# Patient Record
Sex: FEMALE | Race: WHITE | NOT HISPANIC OR LATINO | Employment: FULL TIME | URBAN - NONMETROPOLITAN AREA
[De-identification: names, ages, dates, MRNs, and addresses within clinical notes are randomized per-mention and may not be internally consistent; named-entity substitution may affect disease eponyms.]

---

## 2017-06-29 ENCOUNTER — OUTSIDE FACILITY SERVICE (OUTPATIENT)
Dept: CARDIOLOGY | Facility: CLINIC | Age: 27
End: 2017-06-29

## 2017-06-29 PROCEDURE — 93306 TTE W/DOPPLER COMPLETE: CPT | Performed by: INTERNAL MEDICINE

## 2017-07-13 ENCOUNTER — OFFICE VISIT (OUTPATIENT)
Dept: CARDIOLOGY | Facility: CLINIC | Age: 27
End: 2017-07-13

## 2017-07-13 VITALS
HEART RATE: 76 BPM | SYSTOLIC BLOOD PRESSURE: 130 MMHG | DIASTOLIC BLOOD PRESSURE: 60 MMHG | BODY MASS INDEX: 34.93 KG/M2 | WEIGHT: 244 LBS | HEIGHT: 70 IN | OXYGEN SATURATION: 99 %

## 2017-07-13 DIAGNOSIS — Z3A.15 15 WEEKS GESTATION OF PREGNANCY: ICD-10-CM

## 2017-07-13 DIAGNOSIS — I35.0 NONRHEUMATIC AORTIC VALVE STENOSIS: ICD-10-CM

## 2017-07-13 DIAGNOSIS — Z95.2 H/O PULMONIC VALVE REPLACEMENT: Primary | ICD-10-CM

## 2017-07-13 PROBLEM — K27.9 PEPTIC ULCER DISEASE: Status: ACTIVE | Noted: 2017-07-13

## 2017-07-13 PROBLEM — F41.9 ANXIETY: Status: ACTIVE | Noted: 2017-07-13

## 2017-07-13 PROCEDURE — 99213 OFFICE O/P EST LOW 20 MIN: CPT | Performed by: INTERNAL MEDICINE

## 2017-07-13 RX ORDER — HEPARIN SODIUM 5000 [USP'U]/.5ML
5000 INJECTION, SOLUTION INTRAVENOUS; SUBCUTANEOUS EVERY 8 HOURS SCHEDULED
COMMUNITY
End: 2017-12-20

## 2017-07-13 RX ORDER — PANTOPRAZOLE SODIUM 40 MG/1
40 TABLET, DELAYED RELEASE ORAL DAILY
COMMUNITY
End: 2021-03-16 | Stop reason: ALTCHOICE

## 2017-07-13 RX ORDER — LORATADINE ORAL 5 MG/5ML
SOLUTION ORAL AS NEEDED
COMMUNITY
End: 2018-02-23

## 2017-07-13 RX ORDER — LABETALOL 100 MG/1
300 TABLET, FILM COATED ORAL 3 TIMES DAILY
COMMUNITY
End: 2018-02-14 | Stop reason: ALTCHOICE

## 2017-07-13 RX ORDER — PRENATAL VIT NO.126/IRON/FOLIC 28MG-0.8MG
1 TABLET ORAL DAILY
COMMUNITY
End: 2017-12-20

## 2017-07-13 NOTE — PROGRESS NOTES
Reason for Visit: cardiovascular follow up.    HPI:  Nevaeh Atkinson is a 27 y.o. female is here today for follow-up.  She is former patient of Dr Toro and is here for 1 year follow up.  She just had her echo repeated which demonstrated stable gradients and function compared to previous echo from 1 year ago.  She is 15 weeks pregnant.  Does not have any current complaints or problems.  She follows with a high risk MFM specialist who plans to repeat echo at 32 weeks.       Previous Cardiac Testing and Procedures:  - Echo (05/25/2016) EF 60%, grade 2 diastolic filling pattern, mild aortic stenosis with mean gradient 14 mmHg, EB 1.76 cm², mild AI, mild MR, bioprosthetic pulmonary valve with peak gradient 15 mmHg, RVSP 35 mmHg  - Echo (06/29/2017) EF 65%, normal diastolic function, mild aortic stenosis with mean gradient 6 mmHg, mild aortic regurgitation, mild pulmonic regurgitation, bioprosthetic pulmonic valve with peak gradient 9 mmHg, mean gradient 5 mmHg, mild TR    Patient Active Problem List   Diagnosis   • H/O pulmonic valve replacement   • Aortic stenosis   • Peptic ulcer disease   • Anxiety       Social History   Substance Use Topics   • Smoking status: Never Smoker   • Smokeless tobacco: Never Used   • Alcohol use No       Family History   Problem Relation Age of Onset   • Adopted: Yes       The following portions of the patient's history were reviewed and updated as appropriate: allergies, current medications, past family history, past medical history, past social history, past surgical history and problem list.      Current Outpatient Prescriptions:   •  Heparin Sodium, Porcine, (HEPARIN, PORCINE,) 5000 UNIT/0.5ML injection, Inject 5,000 Units under the skin Every 8 (Eight) Hours., Disp: , Rfl:   •  labetalol (NORMODYNE) 100 MG tablet, Take 50 mg by mouth Daily., Disp: , Rfl:   •  loratadine (CLARITIN) 5 MG/5ML syrup, Take  by mouth As Needed for Allergies., Disp: , Rfl:   •  pantoprazole (PROTONIX) 40  "MG EC tablet, Take 40 mg by mouth Daily., Disp: , Rfl:   •  Prenatal Vit-Fe Fumarate-FA (PRENATAL, CLASSIC, VITAMIN) 28-0.8 MG tablet tablet, Take 1 tablet by mouth Daily., Disp: , Rfl:     Review of Systems   Constitution: Positive for malaise/fatigue. Negative for chills, decreased appetite and fever.   HENT: Negative for congestion, headaches and nosebleeds.    Eyes: Negative for blurred vision and double vision.   Cardiovascular: Positive for leg swelling. Negative for chest pain, irregular heartbeat and palpitations.   Respiratory: Negative for cough and shortness of breath.    Endocrine: Negative for cold intolerance and heat intolerance.   Hematologic/Lymphatic: Bruises/bleeds easily.   Skin: Positive for itching. Negative for dry skin.   Musculoskeletal: Positive for back pain and muscle cramps. Negative for joint pain and neck pain.   Gastrointestinal: Positive for heartburn. Negative for abdominal pain, constipation, diarrhea and melena.   Genitourinary: Positive for frequency. Negative for dysuria and hematuria.   Neurological: Positive for dizziness. Negative for light-headedness and loss of balance.   Psychiatric/Behavioral: Negative for depression. The patient does not have insomnia and is not nervous/anxious.        Objective   /60 (BP Location: Left arm, Patient Position: Sitting, Cuff Size: Large Adult)  Pulse 76  Ht 70\" (177.8 cm)  Wt 244 lb (111 kg)  SpO2 99%  BMI 35.01 kg/m2  Physical Exam   Constitutional: She is oriented to person, place, and time. She appears well-developed and well-nourished.   HENT:   Head: Normocephalic and atraumatic.   Cardiovascular: Normal rate and regular rhythm.    Murmur (Grade II/VI systolic murmur) heard.  Pulmonary/Chest: Effort normal and breath sounds normal.   Musculoskeletal: She exhibits no edema.   Neurological: She is alert and oriented to person, place, and time.   Skin: Skin is warm and dry.   Psychiatric: She has a normal mood and affect. "     Procedures      ICD-10-CM ICD-9-CM   1. H/O pulmonic valve replacement Z95.2 V43.3   2. Nonrheumatic aortic valve stenosis I35.0 424.1   3. 15 weeks gestation of pregnancy Z3A.15 V22.2         Assessment/Plan:  1.  Pulmonic valve replacement: Stable gradient and function on repeat echo from 6/2017.  Remains asymptomatic and stable.      2.  Aortic stenosis: Mild with stable gradient compared to previous echo from 2016.    3.  Pregnancy:  Currently at 15 weeks.  I do not anticipate that her valvular heart disease should cause any issues with her pregnancy.  Her [facility] physician plans to repeat echo at 32 weeks which is reasonable.

## 2017-12-20 ENCOUNTER — OFFICE VISIT (OUTPATIENT)
Dept: CARDIOLOGY | Facility: CLINIC | Age: 27
End: 2017-12-20

## 2017-12-20 VITALS
HEART RATE: 67 BPM | OXYGEN SATURATION: 97 % | BODY MASS INDEX: 34.65 KG/M2 | DIASTOLIC BLOOD PRESSURE: 60 MMHG | WEIGHT: 242 LBS | SYSTOLIC BLOOD PRESSURE: 170 MMHG | HEIGHT: 70 IN

## 2017-12-20 DIAGNOSIS — N18.9 CHRONIC KIDNEY DISEASE, UNSPECIFIED CKD STAGE: Chronic | ICD-10-CM

## 2017-12-20 DIAGNOSIS — Z95.2 H/O PULMONIC VALVE REPLACEMENT: Chronic | ICD-10-CM

## 2017-12-20 DIAGNOSIS — I35.0 NONRHEUMATIC AORTIC VALVE STENOSIS: Chronic | ICD-10-CM

## 2017-12-20 PROCEDURE — 99214 OFFICE O/P EST MOD 30 MIN: CPT | Performed by: NURSE PRACTITIONER

## 2017-12-20 RX ORDER — NIFEDIPINE 30 MG/1
30 TABLET, EXTENDED RELEASE ORAL
COMMUNITY
End: 2017-12-20 | Stop reason: SDUPTHER

## 2017-12-20 RX ORDER — NIFEDIPINE 30 MG/1
30 TABLET, EXTENDED RELEASE ORAL 3 TIMES DAILY
Qty: 90 TABLET | Refills: 1 | Status: SHIPPED | OUTPATIENT
Start: 2017-12-20 | End: 2017-12-26 | Stop reason: SDUPTHER

## 2017-12-20 RX ORDER — HYDRALAZINE HYDROCHLORIDE 10 MG/1
20 TABLET, FILM COATED ORAL EVERY 4 HOURS PRN
COMMUNITY
End: 2018-02-14

## 2017-12-20 NOTE — PROGRESS NOTES
Subjective:     Encounter Date:2017    Chief Complaint:    Patient ID: Nevaeh Atkinson is a 27 y.o. female here today for cardiac follow-up. She had an eclamptic seizure and emergency  17.  Her BP has remained high even with increasing doses of medication. She has gone to the ER twice. She last saw Dr. Gamino in office 2017. 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    HPI     Hypertension    Additional comments: pre-eclampsia day of emergency  17, taking labetolol, procardia (started Monday), and prn hydralazine       Last edited by CRISTIANE Clements on 2017  2:43 PM. (History)        History:   Past Medical History:   Diagnosis Date   • Anxiety    • Aortic valve disorder    • Cardiac murmur     Likely secondary to homograft of pulmonic valve. Mild AS.   • Chronic kidney disease     previously saw Dr. Navarro   • Depression    • H/O pulmonic valve replacement     Peak gradient 20mm Hg on echo. Homograft valve.   • Heart valve disease    • Hypertension, essential, benign      Past Surgical History:   Procedure Laterality Date   • CARDIAC CATHETERIZATION     • CARDIAC VALVE REPLACEMENT      pulmonic at 18 mo's, redo at age 14   • CARDIAC VALVE SURGERY     •  SECTION  2012    first  2012 emergent, 2nd planned 2014   •  SECTION WITH TUBAL  2017   • CHOLECYSTECTOMY     • HERNIA REPAIR     • OTHER SURGICAL HISTORY      perforated ulcer     Social History     Social History   • Marital status:      Spouse name: N/A   • Number of children: N/A   • Years of education: N/A     Occupational History   • Not on file.     Social History Main Topics   • Smoking status: Never Smoker   • Smokeless tobacco: Never Used   • Alcohol use No   • Drug use: No   • Sexual activity: Not Currently     Partners: Male     Other Topics Concern   • Not on file     Social History Narrative     Family History   Problem Relation Age of Onset   • Adopted: Yes        Outpatient Prescriptions Marked as Taking for the 12/20/17 encounter (Office Visit) with VirgenCRISTIANE Weinstein   Medication Sig Dispense Refill   • hydrALAZINE (APRESOLINE) 10 MG tablet Take 20 mg by mouth Every 4 (Four) Hours As Needed. Takes 2 pills every 4 hr prn      • labetalol (NORMODYNE) 100 MG tablet Take 300 mg by mouth 3 (Three) Times a Day.     • NIFEdipine XL (PROCARDIA XL) 30 MG 24 hr tablet Take 1 tablet by mouth 3 (Three) Times a Day. 90 tablet 1   • pantoprazole (PROTONIX) 40 MG EC tablet Take 40 mg by mouth Daily.     • [DISCONTINUED] NIFEdipine XL (PROCARDIA XL) 30 MG 24 hr tablet Take 30 mg by mouth 2 (Two) Times a Day.         Review of Systems:  Review of Systems   Constitution: Positive for malaise/fatigue (a little worse than with previous 2 c-sections). Negative for chills, decreased appetite and fever.   HENT: Negative for congestion and nosebleeds.    Eyes: Negative for blurred vision and double vision.   Cardiovascular: Positive for leg swelling (improving after 3 days of 10 mg furosemide after discharge 12/7/17). Negative for chest pain, irregular heartbeat, palpitations and paroxysmal nocturnal dyspnea.   Respiratory: Negative for cough and shortness of breath.    Endocrine: Negative for cold intolerance and heat intolerance.   Hematologic/Lymphatic: Bruises/bleeds easily.   Skin: Negative for dry skin and itching.   Musculoskeletal: Positive for back pain and muscle cramps. Negative for arthritis, joint pain and neck pain.   Gastrointestinal: Negative for abdominal pain, constipation, diarrhea, heartburn and melena.   Genitourinary: Negative for dysuria, frequency and hematuria.   Neurological: Positive for headaches (not always when BP elevated). Negative for dizziness, light-headedness and loss of balance.   Psychiatric/Behavioral: Negative for depression. The patient does not have insomnia and is not nervous/anxious.             Objective:   /60 (BP Location: Left  "arm, Patient Position: Sitting, Cuff Size: Adult)  Pulse 67  Ht 177.8 cm (70\")  Wt 110 kg (242 lb)  SpO2 97%  BMI 34.72 kg/m2  Wt Readings from Last 3 Encounters:   12/20/17 110 kg (242 lb)   07/13/17 111 kg (244 lb)         Physical Exam   Constitutional: She is oriented to person, place, and time. She appears well-developed and well-nourished.   HENT:   Head: Normocephalic and atraumatic.   Right Ear: External ear normal.   Left Ear: External ear normal.   Nose: Nose normal.   Mouth/Throat: Oropharynx is clear and moist.   Eyes: Conjunctivae and EOM are normal. Pupils are equal, round, and reactive to light. Right eye exhibits no discharge. Left eye exhibits no discharge. No scleral icterus.   Neck: Normal range of motion. Neck supple. No JVD present. No tracheal deviation present. No thyromegaly present.   Cardiovascular: Normal rate and regular rhythm.  Exam reveals no gallop and no friction rub.    Murmur heard.  Pulmonary/Chest: Effort normal and breath sounds normal. She has no wheezes. She has no rales.   Abdominal: Soft. Bowel sounds are normal. She exhibits no mass. There is no tenderness. There is no rebound and no guarding.   Musculoskeletal: She exhibits edema. She exhibits no tenderness or deformity.   Lymphadenopathy:     She has no cervical adenopathy.   Neurological: She is alert and oriented to person, place, and time. No cranial nerve deficit.   Skin: Skin is warm and dry.   Psychiatric: She has a normal mood and affect. Her behavior is normal. Judgment and thought content normal.   Vitals reviewed.      Lab/Diagnostics Review:   12/15/2017   CBC WBC 8500, Hemoglobin 10.2, hematocrit 30.5%, platelets 247,000  CMP sodium 139, potassium 4.3, chloride 103, CO2 28.5, BUN 25, creatinine 2.84, total protein 6.1, albumin 1.9, total bilirubin 0.13, alkaline phosphatase 153, AST 37, ALT 44, calcium 9.9, glucose 109.  TSH 3.002    6/29/2017 Echocardiogram EF 65%, trivial MR, mild AS, mild AI, mild TR, " mild PI.     5/25/2016 Echocardiogram EF 60%, grade 2 diastolic dysfunction, mild AS, mild AI, mild MR, normal RV size and function, bioprosthetic pulmonic valve well seated with peak gradient 15 mm Hg. No significant regurgitation. Peak PA pressure 35 mm Hg.    Procedures: none in office today        Assessment/Plan:         Nevaeh was seen today for hypertension.    Diagnoses and all orders for this visit:    Eclampsia, delivered  Comments:  increase procardia to every 8 hours, continue labetolol and prn hydralazine - will wean meds as able, call if doesn't improve in the next 4 - 5 weeks  Orders:  -     Basic Metabolic Panel  -     NIFEdipine XL (PROCARDIA XL) 30 MG 24 hr tablet; Take 1 tablet by mouth 3 (Three) Times a Day.    Nonrheumatic aortic valve stenosis  Comments:  mild per 6/2017 echo    H/O pulmonic valve replacement  Comments:  stable per 6/2017 echo    Chronic kidney disease, unspecified CKD stage  Comments:  Cr was 2.04 12/15/17, recheck BMP today, refer back to nephrology - her usual is reportedly 1.3 to 1.4 - previously saw Dr. Navarro  Orders:  -     Basic Metabolic Panel  -     Ambulatory Referral to Nephrology        Return in about 2 months (around 2/20/2018) for Recheck, call if SBP persistently greater than 150.           CRISTIANE Mccord, ACNP-BC, CHFN-BC

## 2017-12-21 NOTE — PROGRESS NOTES
Notified patient of increased Creatinine to 2.44.  Will recheck in 2 weeks.  Await nephrology referral.  this afternoon.

## 2017-12-26 ENCOUNTER — DOCUMENTATION (OUTPATIENT)
Dept: CARDIOLOGY | Facility: CLINIC | Age: 27
End: 2017-12-26

## 2017-12-26 RX ORDER — NIFEDIPINE 30 MG/1
TABLET, EXTENDED RELEASE ORAL
Qty: 90 TABLET | Refills: 1 | Status: SHIPPED | OUTPATIENT
Start: 2017-12-26 | End: 2018-02-14

## 2018-02-13 ENCOUNTER — TRANSCRIBE ORDERS (OUTPATIENT)
Dept: ADMINISTRATIVE | Facility: HOSPITAL | Age: 28
End: 2018-02-13

## 2018-02-13 DIAGNOSIS — N05.1 FOCAL GLOMERULAR SCLEROSIS: Primary | ICD-10-CM

## 2018-02-14 ENCOUNTER — OFFICE VISIT (OUTPATIENT)
Dept: CARDIOLOGY | Facility: CLINIC | Age: 28
End: 2018-02-14

## 2018-02-14 VITALS
WEIGHT: 239 LBS | HEIGHT: 70 IN | BODY MASS INDEX: 34.22 KG/M2 | OXYGEN SATURATION: 98 % | SYSTOLIC BLOOD PRESSURE: 130 MMHG | HEART RATE: 75 BPM | DIASTOLIC BLOOD PRESSURE: 60 MMHG

## 2018-02-14 DIAGNOSIS — Z95.2 H/O PULMONIC VALVE REPLACEMENT: Primary | ICD-10-CM

## 2018-02-14 DIAGNOSIS — I15.8 OTHER SECONDARY HYPERTENSION: ICD-10-CM

## 2018-02-14 DIAGNOSIS — I35.0 NONRHEUMATIC AORTIC VALVE STENOSIS: ICD-10-CM

## 2018-02-14 PROCEDURE — 99213 OFFICE O/P EST LOW 20 MIN: CPT | Performed by: INTERNAL MEDICINE

## 2018-02-14 RX ORDER — LISINOPRIL 10 MG/1
10 TABLET ORAL DAILY
COMMUNITY
End: 2019-08-08 | Stop reason: ALTCHOICE

## 2018-02-14 NOTE — PROGRESS NOTES
Reason for Visit: cardiovascular follow up.    HPI:  Nevaeh Atkinson is a 28 y.o. female is here today for follow-up.  She ended up having an emergent  in December due to an eclamptic seizure.  She has been doing well today.  Blood pressure has been running mainly in the 130s at home.  She denies any chest pain, palpitations, dizziness, syncope, PND, orthopnea.    Previous Cardiac Testing and Procedures:  - Echo (2016) EF 60%, grade 2 diastolic filling pattern, mild aortic stenosis with mean gradient 14 mmHg, EB 1.76 cm², mild AI, mild MR, bioprosthetic pulmonary valve with peak gradient 15 mmHg, RVSP 35 mmHg  - Echo (2017) EF 65%, normal diastolic function, mild aortic stenosis with mean gradient 6 mmHg, mild aortic regurgitation, mild pulmonic regurgitation, bioprosthetic pulmonic valve with peak gradient 9 mmHg, mean gradient 5 mmHg, mild TR    Patient Active Problem List   Diagnosis   • H/O pulmonic valve replacement   • Aortic stenosis   • Peptic ulcer disease   • Anxiety   • Eclampsia, delivered   • CKD (chronic kidney disease)       Social History   Substance Use Topics   • Smoking status: Never Smoker   • Smokeless tobacco: Never Used   • Alcohol use No       Family History   Problem Relation Age of Onset   • Adopted: Yes       The following portions of the patient's history were reviewed and updated as appropriate: allergies, current medications, past family history, past medical history, past social history, past surgical history and problem list.      Current Outpatient Prescriptions:   •  lisinopril (PRINIVIL,ZESTRIL) 10 MG tablet, Take 10 mg by mouth Daily., Disp: , Rfl:   •  loratadine (CLARITIN) 5 MG/5ML syrup, Take  by mouth As Needed for Allergies., Disp: , Rfl:   •  pantoprazole (PROTONIX) 40 MG EC tablet, Take 40 mg by mouth Daily., Disp: , Rfl:     Review of Systems   Constitution: Negative for chills and fever.   Cardiovascular: Negative for chest pain and paroxysmal  "nocturnal dyspnea.   Respiratory: Negative for cough and shortness of breath.    Skin: Negative for rash.   Gastrointestinal: Negative for abdominal pain and heartburn.   Neurological: Negative for dizziness and numbness.       Objective   /60 (BP Location: Left arm, Patient Position: Sitting, Cuff Size: Adult)  Pulse 75  Ht 177.8 cm (70\")  Wt 108 kg (239 lb)  SpO2 98%  BMI 34.29 kg/m2  Physical Exam   Constitutional: She is oriented to person, place, and time. She appears well-developed and well-nourished.   HENT:   Head: Normocephalic and atraumatic.   Cardiovascular: Normal rate, regular rhythm and normal heart sounds.    No murmur heard.  Pulmonary/Chest: Effort normal and breath sounds normal.   Musculoskeletal: She exhibits no edema.   Neurological: She is alert and oriented to person, place, and time.   Skin: Skin is warm and dry.   Psychiatric: She has a normal mood and affect.     Procedures      ICD-10-CM ICD-9-CM   1. H/O pulmonic valve replacement Z95.2 V43.3   2. Nonrheumatic aortic valve stenosis I35.0 424.1   3. Other secondary hypertension I15.8 405.99         Assessment/Plan:  1. Pulmonic valve replacement: Stable gradient and function on repeat echo from 6/2017.  Remains asymptomatic and stable.       2.  Aortic stenosis: Mild with stable gradient compared to previous echo from 2016.     3. Hypertension:  Likely secondary to eclampsia from recent pregnancy.  Blood pressure is well controlled today on lisinopril.    "

## 2018-02-23 ENCOUNTER — HOSPITAL ENCOUNTER (OUTPATIENT)
Dept: CT IMAGING | Facility: HOSPITAL | Age: 28
Discharge: HOME | End: 2018-02-23
Attending: INTERNAL MEDICINE | Admitting: INTERNAL MEDICINE

## 2018-02-23 VITALS
DIASTOLIC BLOOD PRESSURE: 42 MMHG | HEIGHT: 70 IN | BODY MASS INDEX: 33.52 KG/M2 | HEART RATE: 72 BPM | TEMPERATURE: 97.4 F | SYSTOLIC BLOOD PRESSURE: 117 MMHG | OXYGEN SATURATION: 98 % | RESPIRATION RATE: 18 BRPM | WEIGHT: 234.13 LBS

## 2018-02-23 DIAGNOSIS — N05.1 FOCAL GLOMERULAR SCLEROSIS: ICD-10-CM

## 2018-02-23 LAB
APTT PPP: 26.9 SECONDS (ref 24.1–34.8)
DEPRECATED RDW RBC AUTO: 41.2 FL (ref 40–54)
ERYTHROCYTE [DISTWIDTH] IN BLOOD BY AUTOMATED COUNT: 13.4 % (ref 12–15)
HCG SERPL QL: NEGATIVE
HCT VFR BLD AUTO: 36.3 % (ref 37–47)
HGB BLD-MCNC: 11.9 G/DL (ref 12–16)
INR PPP: 0.85 (ref 0.91–1.09)
MCH RBC QN AUTO: 27.7 PG (ref 28–32)
MCHC RBC AUTO-ENTMCNC: 32.8 G/DL (ref 33–36)
MCV RBC AUTO: 84.4 FL (ref 82–98)
PLATELET # BLD AUTO: 316 10*3/MM3 (ref 130–400)
PMV BLD AUTO: 10.8 FL (ref 6–12)
PROTHROMBIN TIME: 11.9 SECONDS (ref 11.9–14.6)
RBC # BLD AUTO: 4.3 10*6/MM3 (ref 4.2–5.4)
WBC NRBC COR # BLD: 7.04 10*3/MM3 (ref 4.8–10.8)

## 2018-02-23 PROCEDURE — 85610 PROTHROMBIN TIME: CPT | Performed by: INTERNAL MEDICINE

## 2018-02-23 PROCEDURE — 77012 CT SCAN FOR NEEDLE BIOPSY: CPT

## 2018-02-23 PROCEDURE — 25010000002 FENTANYL CITRATE (PF) 100 MCG/2ML SOLUTION: Performed by: RADIOLOGY

## 2018-02-23 PROCEDURE — 85027 COMPLETE CBC AUTOMATED: CPT | Performed by: INTERNAL MEDICINE

## 2018-02-23 PROCEDURE — 25010000002 MIDAZOLAM PER 1 MG: Performed by: RADIOLOGY

## 2018-02-23 PROCEDURE — 85730 THROMBOPLASTIN TIME PARTIAL: CPT | Performed by: INTERNAL MEDICINE

## 2018-02-23 PROCEDURE — 84703 CHORIONIC GONADOTROPIN ASSAY: CPT | Performed by: INTERNAL MEDICINE

## 2018-02-23 RX ORDER — MIDAZOLAM HYDROCHLORIDE 1 MG/ML
INJECTION INTRAMUSCULAR; INTRAVENOUS
Status: COMPLETED | OUTPATIENT
Start: 2018-02-23 | End: 2018-02-23

## 2018-02-23 RX ORDER — FENTANYL CITRATE 50 UG/ML
INJECTION, SOLUTION INTRAMUSCULAR; INTRAVENOUS
Status: COMPLETED | OUTPATIENT
Start: 2018-02-23 | End: 2018-02-23

## 2018-02-23 RX ORDER — SODIUM CHLORIDE 0.9 % (FLUSH) 0.9 %
1-10 SYRINGE (ML) INJECTION AS NEEDED
Status: DISCONTINUED | OUTPATIENT
Start: 2018-02-23 | End: 2018-02-24 | Stop reason: HOSPADM

## 2018-02-23 RX ORDER — LORATADINE 10 MG/1
10 TABLET ORAL DAILY
COMMUNITY

## 2018-02-23 RX ORDER — LIDOCAINE HYDROCHLORIDE 10 MG/ML
INJECTION, SOLUTION INFILTRATION; PERINEURAL
Status: COMPLETED | OUTPATIENT
Start: 2018-02-23 | End: 2018-02-23

## 2018-02-23 RX ADMIN — LIDOCAINE HYDROCHLORIDE 10 ML: 10 INJECTION, SOLUTION INFILTRATION; PERINEURAL at 10:36

## 2018-02-23 RX ADMIN — FENTANYL CITRATE 25 MCG: 50 INJECTION, SOLUTION INTRAMUSCULAR; INTRAVENOUS at 10:34

## 2018-02-23 RX ADMIN — MIDAZOLAM 1 MG: 1 INJECTION INTRAMUSCULAR; INTRAVENOUS at 10:34

## 2018-02-23 RX ADMIN — LIDOCAINE HYDROCHLORIDE 5 ML: 10 INJECTION, SOLUTION INFILTRATION; PERINEURAL at 10:40

## 2018-02-23 NOTE — PRE-PROCEDURE NOTE
This patient has been evaluated in the Radiology Department prior to CT guided kidney biopsy.    Risks, benefits, and alternatives reviewed.    Airway assessment performed and documented.    H&P on chart, reviewed and no significant change.    INR and PLT are within the normal range.

## 2018-08-09 ENCOUNTER — OFFICE VISIT (OUTPATIENT)
Dept: CARDIOLOGY | Facility: CLINIC | Age: 28
End: 2018-08-09

## 2018-08-09 VITALS
HEART RATE: 63 BPM | BODY MASS INDEX: 29.78 KG/M2 | OXYGEN SATURATION: 98 % | DIASTOLIC BLOOD PRESSURE: 72 MMHG | SYSTOLIC BLOOD PRESSURE: 110 MMHG | WEIGHT: 208 LBS | HEIGHT: 70 IN

## 2018-08-09 DIAGNOSIS — I10 ESSENTIAL HYPERTENSION: ICD-10-CM

## 2018-08-09 DIAGNOSIS — I35.0 NONRHEUMATIC AORTIC VALVE STENOSIS: ICD-10-CM

## 2018-08-09 DIAGNOSIS — Z95.2 H/O PULMONIC VALVE REPLACEMENT: Primary | ICD-10-CM

## 2018-08-09 PROCEDURE — 99213 OFFICE O/P EST LOW 20 MIN: CPT | Performed by: INTERNAL MEDICINE

## 2018-08-09 NOTE — PROGRESS NOTES
Reason for Visit: cardiovascular follow up.    HPI:  Nevaeh Atkinson is a 28 y.o. female is here today for follow-up.  She had a perforated ulcer in May and had to have emergent surgery.  She is otherwise doing well and not having any other issues or problems.  She denies any chest pain, palpitations, chest pain, dizziness, syncope, PND, or orthopnea.  Blood pressure has been well controlled at home.      Previous Cardiac Testing and Procedures:  - Echo (05/25/2016) EF 60%, grade 2 diastolic filling pattern, mild aortic stenosis with mean gradient 14 mmHg, EB 1.76 cm², mild AI, mild MR, bioprosthetic pulmonary valve with peak gradient 15 mmHg, RVSP 35 mmHg  - Echo (06/29/2017) EF 65%, normal diastolic function, mild aortic stenosis with mean gradient 6 mmHg, mild aortic regurgitation, mild pulmonic regurgitation, bioprosthetic pulmonic valve with peak gradient 9 mmHg, mean gradient 5 mmHg, mild TR    Patient Active Problem List   Diagnosis   • H/O pulmonic valve replacement   • Aortic stenosis   • Peptic ulcer disease   • Anxiety   • Eclampsia, delivered   • CKD (chronic kidney disease)   • Essential hypertension       Social History   Substance Use Topics   • Smoking status: Never Smoker   • Smokeless tobacco: Never Used   • Alcohol use No       Family History   Problem Relation Age of Onset   • Adopted: Yes       The following portions of the patient's history were reviewed and updated as appropriate: allergies, current medications, past family history, past medical history, past social history, past surgical history and problem list.      Current Outpatient Prescriptions:   •  lisinopril (PRINIVIL,ZESTRIL) 10 MG tablet, Take 10 mg by mouth Daily., Disp: , Rfl:   •  loratadine (CLARITIN) 10 MG tablet, Take 10 mg by mouth Daily., Disp: , Rfl:   •  pantoprazole (PROTONIX) 40 MG EC tablet, Take 40 mg by mouth Daily., Disp: , Rfl:     Review of Systems   Constitution: Negative for chills and fever.   Cardiovascular:  "Negative for chest pain and paroxysmal nocturnal dyspnea.   Respiratory: Negative for cough and shortness of breath.    Skin: Negative for rash.   Gastrointestinal: Negative for abdominal pain and heartburn.   Neurological: Negative for dizziness and numbness.       Objective   /72 (BP Location: Left arm, Patient Position: Sitting, Cuff Size: Adult)   Pulse 63   Ht 177.8 cm (70\")   Wt 94.3 kg (208 lb)   SpO2 98%   BMI 29.84 kg/m²   Physical Exam   Constitutional: She is oriented to person, place, and time. She appears well-developed and well-nourished.   HENT:   Head: Normocephalic and atraumatic.   Cardiovascular: Normal rate and regular rhythm.    Murmur (grade II systolic murmur) heard.  Pulmonary/Chest: Effort normal and breath sounds normal.   Musculoskeletal: She exhibits no edema.   Neurological: She is alert and oriented to person, place, and time.   Skin: Skin is warm and dry.   Psychiatric: She has a normal mood and affect.     Procedures      ICD-10-CM ICD-9-CM   1. H/O pulmonic valve replacement Z95.2 V43.3   2. Nonrheumatic aortic valve stenosis I35.0 424.1   3. Essential hypertension I10 401.9         Assessment/Plan:  1. Pulmonic valve replacement: Stable gradient and function on repeat echo from 6/2017.  Remains asymptomatic and stable, continue to monitor.     2. Aortic stenosis: Mild with stable gradient compared to previous echo.  Continue to monitor.      3. Hypertension:  Blood pressure is well controlled today on current therapy.    "

## 2019-08-08 ENCOUNTER — OFFICE VISIT (OUTPATIENT)
Dept: CARDIOLOGY | Facility: CLINIC | Age: 29
End: 2019-08-08

## 2019-08-08 VITALS
BODY MASS INDEX: 30.49 KG/M2 | HEIGHT: 70 IN | SYSTOLIC BLOOD PRESSURE: 114 MMHG | HEART RATE: 84 BPM | DIASTOLIC BLOOD PRESSURE: 80 MMHG | WEIGHT: 213 LBS | OXYGEN SATURATION: 98 %

## 2019-08-08 DIAGNOSIS — I10 ESSENTIAL HYPERTENSION: ICD-10-CM

## 2019-08-08 DIAGNOSIS — Z95.2 H/O PULMONIC VALVE REPLACEMENT: Primary | ICD-10-CM

## 2019-08-08 DIAGNOSIS — I35.1 AORTIC VALVE INSUFFICIENCY, ETIOLOGY OF CARDIAC VALVE DISEASE UNSPECIFIED: ICD-10-CM

## 2019-08-08 DIAGNOSIS — R60.0 LOWER EXTREMITY EDEMA: ICD-10-CM

## 2019-08-08 PROCEDURE — 99214 OFFICE O/P EST MOD 30 MIN: CPT | Performed by: INTERNAL MEDICINE

## 2019-08-08 RX ORDER — IRBESARTAN 300 MG/1
300 TABLET ORAL EVERY MORNING
Refills: 0 | COMMUNITY
Start: 2019-07-09

## 2019-08-08 RX ORDER — FUROSEMIDE 40 MG/1
40 TABLET ORAL AS NEEDED
Refills: 0 | COMMUNITY
Start: 2019-07-09

## 2019-08-08 NOTE — PROGRESS NOTES
Reason for Visit: cardiovascular follow up.    HPI:  Nevaeh Atkinson is a 29 y.o. female is here today for 1 year follow-up.  She recently has had problems with fluctuations in her blood pressure.  Her blood pressure has been running high recently and she was started on Avapro.  Couple days ago it dropped low and she was very dizzy and lightheaded.  She skipped yesterday's dose due to this.  Today her blood pressure is normal.  She also had some lower extremity edema.  The lower extremity edema has not resolved.  She is not aware of any other significant changes.  She denies any changes to her diet or lifestyle.  She denies any chest pain, palpitations, dizziness, syncope, shortness of breath, PND, or orthopnea.  She had an echocardiogram repeated on 8/6/2019 which demonstrated mild aortic insufficiency, normal function of her prosthetic valve no other significant abnormalities.  There is no change compared to previous echo from 2017.    Previous Cardiac Testing and Procedures:  - Echo (05/25/2016) EF 60%, grade 2 diastolic filling pattern, mild aortic stenosis with mean gradient 14 mmHg, EB 1.76 cm², mild AI, mild MR, bioprosthetic pulmonary valve with peak gradient 15 mmHg, RVSP 35 mmHg  - Echo (06/29/2017) EF 65%, normal diastolic function, mild aortic stenosis with mean gradient 6 mmHg, mild aortic regurgitation, mild pulmonic regurgitation, bioprosthetic pulmonic valve with peak gradient 9 mmHg, mean gradient 5 mmHg, mild TR  -Echo (8/6/2019) EF 65-70%, mild AI, pulmonic valve not well visualized, trivial PI, peak pulmonic gradient 6 mmHg, mean gradient 3 mmHg    Patient Active Problem List   Diagnosis   • H/O pulmonic valve replacement   • Aortic stenosis   • Peptic ulcer disease   • Anxiety   • Eclampsia, delivered   • CKD (chronic kidney disease)   • Essential hypertension   • Aortic valve regurgitation       Social History     Tobacco Use   • Smoking status: Never Smoker   • Smokeless tobacco: Never Used  "  Substance Use Topics   • Alcohol use: No   • Drug use: No       Family History   Adopted: Yes       The following portions of the patient's history were reviewed and updated as appropriate: allergies, current medications, past family history, past medical history, past social history, past surgical history and problem list.      Current Outpatient Medications:   •  furosemide (LASIX) 40 MG tablet, Take 40 mg by mouth Every Morning., Disp: , Rfl: 0  •  irbesartan (AVAPRO) 75 MG tablet, Take 75 mg by mouth Every Morning., Disp: , Rfl: 0  •  loratadine (CLARITIN) 10 MG tablet, Take 10 mg by mouth Daily., Disp: , Rfl:   •  pantoprazole (PROTONIX) 40 MG EC tablet, Take 40 mg by mouth Daily., Disp: , Rfl:     Review of Systems   Constitution: Negative for chills and fever.   Cardiovascular: Positive for leg swelling. Negative for chest pain and paroxysmal nocturnal dyspnea.   Respiratory: Negative for cough and shortness of breath.    Skin: Negative for rash.   Gastrointestinal: Negative for abdominal pain and heartburn.   Neurological: Negative for dizziness and numbness.       Objective   /80 (BP Location: Left arm, Patient Position: Sitting, Cuff Size: Adult)   Pulse 84   Ht 177.8 cm (70\")   Wt 96.6 kg (213 lb)   SpO2 98%   BMI 30.56 kg/m²   Physical Exam   Constitutional: She is oriented to person, place, and time. She appears well-developed and well-nourished.   HENT:   Head: Normocephalic and atraumatic.   Cardiovascular: Normal rate, regular rhythm and normal heart sounds.   No murmur heard.  Pulmonary/Chest: Effort normal and breath sounds normal.   Abdominal: She exhibits distension (obese).   Musculoskeletal: She exhibits no edema.   Neurological: She is alert and oriented to person, place, and time.   Skin: Skin is warm and dry.   Psychiatric: She has a normal mood and affect.     Procedures      ICD-10-CM ICD-9-CM   1. H/O pulmonic valve replacement Z95.2 V43.3   2. Aortic valve insufficiency, " etiology of cardiac valve disease unspecified I35.1 424.1   3. Essential hypertension I10 401.9   4. Lower extremity edema R60.0 782.3         Assessment/Plan:  1. Pulmonic valve replacement: Repeat echo from 8/6/2019 demonstrates stable gradients with only trivial PI.  Continue routine surveillance.       2. Aortic regurgitation: Mild AI but no significant stenosis on recent echo from 8/6/2019.  Continue to monitor.      3. Hypertension:  Blood pressure is normal today.  Recent fluctuations at home.  Unclear etiology for the fluctuations.  Continue to monitor and continue current medications.    4.  Lower extremity edema: Has now resolved.  Normal RV size and function, normal RA size, and normal IVC size and respiratory variation on recent echo from 8/6/2019.

## 2020-09-24 ENCOUNTER — OFFICE VISIT (OUTPATIENT)
Dept: CARDIOLOGY | Facility: CLINIC | Age: 30
End: 2020-09-24

## 2020-09-24 VITALS — BODY MASS INDEX: 30.85 KG/M2 | WEIGHT: 215 LBS

## 2020-09-24 DIAGNOSIS — Z95.2 H/O PULMONIC VALVE REPLACEMENT: Primary | ICD-10-CM

## 2020-09-24 DIAGNOSIS — I35.1 NONRHEUMATIC AORTIC VALVE INSUFFICIENCY: ICD-10-CM

## 2020-09-24 DIAGNOSIS — I10 ESSENTIAL HYPERTENSION: ICD-10-CM

## 2020-09-24 DIAGNOSIS — E66.09 CLASS 1 OBESITY DUE TO EXCESS CALORIES WITH SERIOUS COMORBIDITY AND BODY MASS INDEX (BMI) OF 30.0 TO 30.9 IN ADULT: ICD-10-CM

## 2020-09-24 PROBLEM — I35.0 AORTIC STENOSIS: Status: RESOLVED | Noted: 2017-07-13 | Resolved: 2020-09-24

## 2020-09-24 PROCEDURE — 99441 PR PHYS/QHP TELEPHONE EVALUATION 5-10 MIN: CPT | Performed by: INTERNAL MEDICINE

## 2020-09-24 NOTE — PROGRESS NOTES
Reason for Visit: cardiovascular follow up.    HPI:  Nevaeh Atkinson is a 30 y.o. female is here today for 1 year follow-up via telephone visit.  She has been doing well over the last year and not having any issues or complaints.  She denies any chest pain, palpitations, dizziness, syncope, PND, or orthopnea.  She stays active and has no problems with physical exertion.  She reports eating a heart healthy diet.  She reports her blood pressure is well controlled at home with systolics usually running approximately 130 mmHg.    This visit has been rescheduled as a phone visit to comply with patient safety concerns in accordance with CDC recommendations. Total time of discussion was 5 minutes.  You have chosen to receive care through a telephone visit. Do you consent to use a telephone visit for your medical care today? Yes     Previous Cardiac Testing and Procedures:  -Echo (05/25/2016) EF 60%, grade 2 diastolic filling pattern, mild aortic stenosis with mean gradient 14 mmHg, EB 1.76 cm², mild AI, mild MR, bioprosthetic pulmonary valve with peak gradient 15 mmHg, RVSP 35 mmHg  - Echo (06/29/2017) EF 65%, normal diastolic function, mild aortic stenosis with mean gradient 6 mmHg, mild aortic regurgitation, mild pulmonic regurgitation, bioprosthetic pulmonic valve with peak gradient 9 mmHg, mean gradient 5 mmHg, mild TR  -Echo (8/6/2019) EF 65-70%, mild AI, pulmonic valve not well visualized, trivial PI, peak pulmonic gradient 6 mmHg, mean gradient 3 mmHg    Patient Active Problem List   Diagnosis   • H/O pulmonic valve replacement   • Peptic ulcer disease   • Anxiety   • Eclampsia, delivered   • CKD (chronic kidney disease)   • Essential hypertension   • Aortic valve regurgitation   • Class 1 obesity due to excess calories with serious comorbidity and body mass index (BMI) of 30.0 to 30.9 in adult       Social History     Tobacco Use   • Smoking status: Never Smoker   • Smokeless tobacco: Never Used   Substance Use  Topics   • Alcohol use: No   • Drug use: No       Family History   Adopted: Yes       The following portions of the patient's history were reviewed and updated as appropriate: allergies, current medications, past family history, past medical history, past social history, past surgical history and problem list.      Current Outpatient Medications:   •  furosemide (LASIX) 40 MG tablet, Take 40 mg by mouth As Needed., Disp: , Rfl: 0  •  irbesartan (AVAPRO) 75 MG tablet, Take 75 mg by mouth Every Morning., Disp: , Rfl: 0  •  loratadine (CLARITIN) 10 MG tablet, Take 10 mg by mouth Daily., Disp: , Rfl:   •  pantoprazole (PROTONIX) 40 MG EC tablet, Take 40 mg by mouth Daily., Disp: , Rfl:     Review of Systems   Constitution: Negative for chills and fever.   Cardiovascular: Negative for chest pain and paroxysmal nocturnal dyspnea.   Respiratory: Negative for cough and shortness of breath.    Skin: Negative for rash.   Gastrointestinal: Negative for abdominal pain and heartburn.   Neurological: Negative for dizziness and numbness.       Objective   Wt 97.5 kg (215 lb)   BMI 30.85 kg/m²   Constitutional:       Comments: Unable to complete full physical exam due to telephone visit   Neurological:      Mental Status: Alert and oriented to person, place, and time.   Psychiatric:         Attention and Perception: Attention normal.         Mood and Affect: Mood normal.         Speech: Speech normal.         Behavior: Behavior normal.       Procedures      ICD-10-CM ICD-9-CM   1. H/O pulmonic valve replacement  Z95.2 V43.3   2. Nonrheumatic aortic valve insufficiency  I35.1 424.1   3. Essential hypertension  I10 401.9   4. Class 1 obesity due to excess calories with serious comorbidity and body mass index (BMI) of 30.0 to 30.9 in adult  E66.09 278.00    Z68.30 V85.30         Assessment/Plan:  1. Pulmonic valve replacement: Repeat echo from 8/6/2019 demonstrated stable gradient with only trivial pulmonic insufficiency.  Continue  routine monitoring.       2. Aortic regurgitation: Mild aortic insufficiency on repeat echo from 8/2019 with no evidence of stenosis.  Continue routine monitoring.       3.  Essential hypertension: Reportedly has good control at home.     4.  Obesity: Patient's Body mass index is 30.85 kg/m². BMI is above normal parameters. Recommendations include: exercise counseling and nutrition counseling.

## 2020-12-30 ENCOUNTER — HOSPITAL ENCOUNTER (OUTPATIENT)
Dept: GENERAL RADIOLOGY | Facility: HOSPITAL | Age: 30
Discharge: HOME | End: 2020-12-30

## 2020-12-30 ENCOUNTER — OFFICE VISIT (OUTPATIENT)
Dept: NEUROSURGERY | Facility: CLINIC | Age: 30
End: 2020-12-30

## 2020-12-30 VITALS
BODY MASS INDEX: 29.12 KG/M2 | WEIGHT: 203.4 LBS | SYSTOLIC BLOOD PRESSURE: 112 MMHG | HEIGHT: 70 IN | DIASTOLIC BLOOD PRESSURE: 68 MMHG

## 2020-12-30 DIAGNOSIS — Z78.9 NON-SMOKER: ICD-10-CM

## 2020-12-30 DIAGNOSIS — M51.26 DISC DISPLACEMENT, LUMBAR: ICD-10-CM

## 2020-12-30 DIAGNOSIS — M41.9 SCOLIOSIS OF LUMBAR SPINE, UNSPECIFIED SCOLIOSIS TYPE: ICD-10-CM

## 2020-12-30 DIAGNOSIS — E66.3 OVERWEIGHT WITH BODY MASS INDEX (BMI) OF 29 TO 29.9 IN ADULT: ICD-10-CM

## 2020-12-30 DIAGNOSIS — M51.26 DISC DISPLACEMENT, LUMBAR: Primary | ICD-10-CM

## 2020-12-30 PROCEDURE — 72082 X-RAY EXAM ENTIRE SPI 2/3 VW: CPT

## 2020-12-30 PROCEDURE — 99204 OFFICE O/P NEW MOD 45 MIN: CPT | Performed by: NURSE PRACTITIONER

## 2020-12-30 NOTE — PROGRESS NOTES
Chief complaint:   Chief Complaint   Patient presents with   • Back Pain     Nevaeh is here today with complaint of back pain, she brings an MRI of the lumbar with her from [facility] on cd, no physical therapy, no pain management.        Subjective     HPI: This is a 30-year-old female patient who was referred to us by CRISTIANE Stern for back pain.  She is here to be evaluated today.  The patient says that she did have a fall back in September and did feel like she was getting over this.  She started having pain again in October.  Currently the pain is intermittent.  It is worse with laying down and better with moving.  She says that she has episodes where she has a lot of difficulty with bending twisting or lifting.  Denies any lower extremity pain or numbness and tingling.  Denies any bowel or bladder incontinence.  She has not done any recent physical therapy, chiropractic care, or pain management injections.  She states that she has been told that she had scoliosis in the past and normally works with a chiropractor with good relief however due to Covid-19 she has not been able to get into see her chiropractor at this time.  She is right-hand dominant.  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  Rates her pain on a scale 0-10 at a 7.  She says it does interfere with her actives of daily living patient is .  Denies any tobacco, alcohol, or illicit drug use    Review of Systems   Constitutional: Positive for fatigue.   Eyes: Positive for visual disturbance.   Cardiovascular: Positive for leg swelling.   Musculoskeletal: Positive for back pain, neck pain and neck stiffness.   Skin: Positive for wound.   Allergic/Immunologic: Positive for environmental allergies.   Neurological: Positive for headaches.   Hematological: Bruises/bleeds easily.   All other systems reviewed and are negative.       Past Medical History:   Diagnosis Date   • Anxiety    • Aortic valve disorder    •  "Cardiac murmur     Likely secondary to homograft of pulmonic valve. Mild AS.   • Chronic kidney disease     previously saw Dr. Navarro   • Depression    • Gout    • H/O pulmonic valve replacement     Peak gradient 20mm Hg on echo. Homograft valve.   • Heart valve disease    • Hypertension, essential, benign      Past Surgical History:   Procedure Laterality Date   • CARDIAC CATHETERIZATION     • CARDIAC VALVE REPLACEMENT      pulmonic at 18 mo's, redo at age 14   • CARDIAC VALVE SURGERY     •  SECTION  2012    first  2012 emergent, 2nd planned 2014   •  SECTION WITH TUBAL  2017   • CHOLECYSTECTOMY     • HERNIA REPAIR     • OTHER SURGICAL HISTORY      perforated ulcer     Family History   Adopted: Yes     Social History     Tobacco Use   • Smoking status: Never Smoker   • Smokeless tobacco: Never Used   Substance Use Topics   • Alcohol use: No   • Drug use: No     (Not in a hospital admission)    Allergies:  Allopurinol, Codeine, Erythromycin, Metoclopramide hcl [metoclopramide], Nsaids, Other, and Penicillins    Objective      Vital Signs  /68 (BP Location: Right arm, Patient Position: Sitting)   Ht 177.8 cm (70\")   Wt 92.3 kg (203 lb 6.4 oz)   BMI 29.18 kg/m²     Physical Exam  Constitutional:       Appearance: Normal appearance. She is well-developed.   HENT:      Head: Normocephalic.   Eyes:      General: Lids are normal.      Conjunctiva/sclera: Conjunctivae normal.      Pupils: Pupils are equal, round, and reactive to light.   Neck:      Musculoskeletal: Normal range of motion.   Cardiovascular:      Rate and Rhythm: Normal rate and regular rhythm.   Pulmonary:      Effort: Pulmonary effort is normal.      Breath sounds: Normal breath sounds.   Musculoskeletal: Normal range of motion.      Lumbar back: She exhibits pain.   Skin:     General: Skin is warm.   Neurological:      Mental Status: She is alert and oriented to person, place, and time.      GCS: GCS eye " subscore is 4. GCS verbal subscore is 5. GCS motor subscore is 6.      Cranial Nerves: No cranial nerve deficit.      Sensory: No sensory deficit.      Deep Tendon Reflexes: Reflexes are normal and symmetric. Reflexes normal.   Psychiatric:         Speech: Speech normal.         Behavior: Behavior normal.         Thought Content: Thought content normal.         Neurologic Exam     Mental Status   Oriented to person, place, and time.   Speech: speech is normal     Cranial Nerves     CN III, IV, VI   Pupils are equal, round, and reactive to light.      Results Review: MRI of the lumbar spine that was done on November 12, 2020 shows the patient does have right-sided foraminal narrowing at L5-S1 due to small disc protrusion.  At L4-5 there is mild disc protrusion causing central canal stenosis but no significant foraminal narrowing.  At L3-4 mild disc bulging without any central canal or foraminal compromise.  No fracture visualized.  No cord signal change.  There is also concern for slight scoliosis deformity in the lumbar region.        Assessment/Plan: I am going to send the patient for set of scoliosis x-rays as well as a full set of x-rays of the lumbar spine to include standing flexion and extension.  She has had difficulty with ulcers and is not able to take anti-inflammatories at this time.  She currently is taking gabapentin to help with her pain  For first line conservative care of lumbar pain, I would like to send Ms. Atkinson for a dedicated course of physician directed physical therapy consisting of 2-3 times a week for 4-6 weeks.    Return for reassessment with Dr. Wallace after physical therapy.       I advised the patient to call and return sooner for new or worsening complaints of weakness, paresthesias, gait disturbances, or any additional concerns.  Treatment options discussed in detail with Nevaeh and the patient voiced understanding.  Ms. Atkinson agrees with this plan of care.     Patient is a  nonsmoker  The patient's Body mass index is 29.18 kg/m².. BMI is above normal parameters. Recommendations include: educational material and nutrition counseling    Diagnoses and all orders for this visit:    1. Disc displacement, lumbar (Primary)  -     Ambulatory Referral to Physical Therapy Evaluate and treat (2-3 days a week for 4-6 weeks )  -     XR Scoliosis Complete Including Supine & Erect; Future  -     XR Spine Lumbar Complete With Flex & Ext; Future    2. Scoliosis of lumbar spine, unspecified scoliosis type  -     Ambulatory Referral to Physical Therapy Evaluate and treat (2-3 days a week for 4-6 weeks )  -     XR Scoliosis Complete Including Supine & Erect; Future  -     XR Spine Lumbar Complete With Flex & Ext; Future    3. Overweight with body mass index (BMI) of 29 to 29.9 in adult    4. Non-smoker          I discussed the patients findings and my recommendations with patient    Eddie Cuba, APRN  12/30/20  12:15 CST

## 2020-12-30 NOTE — PATIENT INSTRUCTIONS
"BMI for Adults  What is BMI?  Body mass index (BMI) is a number that is calculated from a person's weight and height. BMI can help estimate how much of a person's weight is composed of fat. BMI does not measure body fat directly. Rather, it is an alternative to procedures that directly measure body fat, which can be difficult and expensive.  BMI can help identify people who may be at higher risk for certain medical problems.  What are BMI measurements used for?  BMI is used as a screening tool to identify possible weight problems. It helps determine whether a person is obese, overweight, a healthy weight, or underweight.  BMI is useful for:  · Identifying a weight problem that may be related to a medical condition or may increase the risk for medical problems.  · Promoting changes, such as changes in diet and exercise, to help reach a healthy weight. BMI screening can be repeated to see if these changes are working.  How is BMI calculated?  BMI involves measuring your weight in relation to your height. Both height and weight are measured, and the BMI is calculated from those numbers. This can be done either in English (U.S[place] or metric measurements. Note that charts and online BMI calculators are available to help you find your BMI quickly and easily without having to do these calculations yourself.  To calculate your BMI in English (U.S[place] measurements:    1. Measure your weight in pounds (lb).  2. Multiply the number of pounds by 703.  ? For example, for a person who weighs 180 lb, multiply that number by 703, which equals 126,540.  3. Measure your height in inches. Then multiply that number by itself to get a measurement called \"inches squared.\"  ? For example, for a person who is 70 inches tall, the \"inches squared\" measurement is 70 inches x 70 inches, which equals 4,900 inches squared.  4. Divide the total from step 2 (number of lb x 703) by the total from step 3 (inches squared): 126,540 ÷ 4,900 = 25.8. This is " "your BMI.  To calculate your BMI in metric measurements:  1. Measure your weight in kilograms (kg).  2. Measure your height in meters (m). Then multiply that number by itself to get a measurement called \"meters squared.\"  ? For example, for a person who is 1.75 m tall, the \"meters squared\" measurement is 1.75 m x 1.75 m, which is equal to 3.1 meters squared.  3. Divide the number of kilograms (your weight) by the meters squared number. In this example: 70 ÷ 3.1 = 22.6. This is your BMI.  What do the results mean?  BMI charts are used to identify whether you are underweight, normal weight, overweight, or obese. The following guidelines will be used:  · Underweight: BMI less than 18.5.  · Normal weight: BMI between 18.5 and 24.9.  · Overweight: BMI between 25 and 29.9.  · Obese: BMI of 30 or above.  Keep these notes in mind:  · Weight includes both fat and muscle, so someone with a muscular build, such as an athlete, may have a BMI that is higher than 24.9. In cases like these, BMI is not an accurate measure of body fat.  · To determine if excess body fat is the cause of a BMI of 25 or higher, further assessments may need to be done by a health care provider.  · BMI is usually interpreted in the same way for men and women.  Where to find more information  For more information about BMI, including tools to quickly calculate your BMI, go to these websites:  · Centers for Disease Control and Prevention: www.cdc.gov  · American Heart Association: www.heart.org  · National Heart, Lung, and Blood [place]: www.nhlbi.nih.gov  Summary  · Body mass index (BMI) is a number that is calculated from a person's weight and height.  · BMI may help estimate how much of a person's weight is composed of fat. BMI can help identify those who may be at higher risk for certain medical problems.  · BMI can be measured using English measurements or metric measurements.  · BMI charts are used to identify whether you are underweight, normal " weight, overweight, or obese.  This information is not intended to replace advice given to you by your health care provider. Make sure you discuss any questions you have with your health care provider.  Document Revised: 09/09/2020 Document Reviewed: 07/17/2020  Elsevier Patient Education © 2020 [employer]

## 2021-01-04 ENCOUNTER — TELEPHONE (OUTPATIENT)
Dept: NEUROSURGERY | Facility: CLINIC | Age: 31
End: 2021-01-04

## 2021-01-04 NOTE — TELEPHONE ENCOUNTER
I rec'd a message from Eddie in my basket that states Dr Wallace wants to see this patient sooner than she is scheduled (April 2021).  I called her today to give her a sooner appt (3/2/21); however, she states she has been placed in quarantine due to COVID and is going to be 2 weeks delayed starting therapy.  I gave her an appt for 3/16/21 and advised her to call me back if she wasn't able to start the therapy in a couple weeks.  She agreed.      ilana roche CMA

## 2021-03-08 ENCOUNTER — TELEPHONE (OUTPATIENT)
Dept: NEUROSURGERY | Facility: CLINIC | Age: 31
End: 2021-03-08

## 2021-03-08 DIAGNOSIS — M51.26 DISC DISPLACEMENT, LUMBAR: Primary | ICD-10-CM

## 2021-03-08 DIAGNOSIS — M41.9 SCOLIOSIS OF LUMBAR SPINE, UNSPECIFIED SCOLIOSIS TYPE: ICD-10-CM

## 2021-03-08 NOTE — TELEPHONE ENCOUNTER
Caller: GIBSON MCNULTY    Relationship: SELF    Best call back number: [phone]    What orders are you requesting (i.e. lab or imaging): XRAY LUMBAR ORDERS    In what timeframe would the patient need to come in: BEFORE FOLLOW UP ON 3/16/21    Where will you receive your lab/imaging services: [facility]    Additional notes: THE PATIENT WANTS TO HAVE HER XRAYS AT [facility] AND ASKED THAT THE ORDERS BE SENT THERE.

## 2021-03-12 ENCOUNTER — TELEPHONE (OUTPATIENT)
Dept: NEUROSURGERY | Facility: CLINIC | Age: 31
End: 2021-03-12

## 2021-03-12 NOTE — TELEPHONE ENCOUNTER
Called to confirm patient's appt with Dr Wallace on 3.16.21  - no answer so left a message to call me back      ilana roche CMA

## 2021-03-15 NOTE — TELEPHONE ENCOUNTER
I called patient back to instruct her to do the x-ray prior to her appointment here, BUT the reason she is asking to have the X-ray at [facility], she works there and her insurance covers better, she already owes a large balance to [facility] and she will bring the disc with her to her appointment.

## 2021-03-15 NOTE — TELEPHONE ENCOUNTER
NG TUBE AUSCULTATION AND RESIDUAL CHECK COMPLETED. AM MEDICATIONS GIVEN TO PT, TOLERATED 
WELL. AM CARE COMPLETED. WILL CONTINUE TO MONITOR. Pt called back and confirmed.

## 2021-03-16 ENCOUNTER — OFFICE VISIT (OUTPATIENT)
Dept: NEUROSURGERY | Facility: CLINIC | Age: 31
End: 2021-03-16

## 2021-03-16 VITALS — BODY MASS INDEX: 29.63 KG/M2 | HEIGHT: 70 IN | WEIGHT: 207 LBS

## 2021-03-16 DIAGNOSIS — E66.3 OVERWEIGHT WITH BODY MASS INDEX (BMI) OF 29 TO 29.9 IN ADULT: ICD-10-CM

## 2021-03-16 DIAGNOSIS — M41.9 SCOLIOSIS OF LUMBAR SPINE, UNSPECIFIED SCOLIOSIS TYPE: ICD-10-CM

## 2021-03-16 DIAGNOSIS — M51.26 DISC DISPLACEMENT, LUMBAR: Primary | ICD-10-CM

## 2021-03-16 DIAGNOSIS — Z78.9 NON-SMOKER: ICD-10-CM

## 2021-03-16 PROCEDURE — 99213 OFFICE O/P EST LOW 20 MIN: CPT | Performed by: NEUROLOGICAL SURGERY

## 2021-03-16 RX ORDER — FLUOXETINE 10 MG/1
10 CAPSULE ORAL DAILY
COMMUNITY
Start: 2020-12-28 | End: 2022-09-28

## 2021-03-16 NOTE — PROGRESS NOTES
SUBJECTIVE:  Patient ID: Nevaeh Atkinson is a 31 y.o. female is here today for follow-up.    Chief Complaint: Back pain  Chief Complaint   Patient presents with   • Back Pain     patient is here for follow up; patient had xrays today @ [facility] (she brought the CD with her for review); patient didn't do the requested therapy due to her copay.  [facility] from [facility] is on P[facility].  Today patient states she is having no pain and in a happy place.       HPI  31-year-old female with the following for back pain.  She has had 3 exacerbations of notable back pain about 7 years.  She denies any lower extremity pain numbness or tingling.  She does relate a history of a herniated disc in her back which did cause leg pain 7 years ago after her first child.  She currently has been managing this episode of back pain with chiropractic care and home therapy.  She is done well.  At this point she is back to her baseline with no meaningful back pain.    The following portions of the patient's history were reviewed and updated as appropriate: allergies, current medications, past family history, past medical history, past social history, past surgical history and problem list.    OBJECTIVE:    Review of Systems   Musculoskeletal: Positive for back pain.   All other systems reviewed and are negative.         Physical Exam  Eyes:      Extraocular Movements: EOM normal.      Pupils: Pupils are equal, round, and reactive to light.   Neurological:      Mental Status: She is oriented to person, place, and time.      Coordination: Finger-Nose-Finger Test normal.      Gait: Gait is intact.      Deep Tendon Reflexes: Strength normal.   Psychiatric:         Speech: Speech normal.         Neurologic Exam     Mental Status   Oriented to person, place, and time.   Attention: normal.   Speech: speech is normal   Level of consciousness: alert  Knowledge: good.     Cranial Nerves     CN II   Visual fields full to confrontation.     CN III, IV,  VI   Pupils are equal, round, and reactive to light.  Extraocular motions are normal.     CN V   Facial sensation intact.     CN VII   Facial expression full, symmetric.     CN VIII   CN VIII normal.     CN IX, X   CN IX normal.   CN X normal.     CN XI   CN XI normal.     CN XII   CN XII normal.     Motor Exam   Muscle bulk: normal  Overall muscle tone: normal  Right arm pronator drift: absent  Left arm pronator drift: absent    Strength   Strength 5/5 throughout.     Sensory Exam   Light touch normal.   Pinprick normal.     Gait, Coordination, and Reflexes     Gait  Gait: normal    Coordination   Finger to nose coordination: normal    Tremor   Resting tremor: absent  Intention tremor: absent  Action tremor: absent    Reflexes   Reflexes 2+ except as noted.       Independent Review of Radiographic Studies:   Plain x-rays and MRI do show some degeneration at the disc spaces most notable at L5-S1 where her previous disc herniation was.  Currently there is no significant neuroforaminal compromise or compression.    ASSESSMENT/PLAN:  The patient does have some degenerative disc disease with a history of prior disc herniation at L5-S1.  Right now she is doing well with good control of her back pain.  I be happy to see her in the future on an as-needed basis.      1. Disc displacement, lumbar    2. Scoliosis of lumbar spine, unspecified scoliosis type    3. Non-smoker    4. Overweight with body mass index (BMI) of 29 to 29.9 in adult        The patient's Body mass index is 29.7 kg/m².. BMI is above normal parameters. Recommendations include: educational material    Return if symptoms worsen or fail to improve.      Bruce Wallace MD

## 2021-03-16 NOTE — PATIENT INSTRUCTIONS
"PATIENT TO CONTINUE TO FOLLOW UP WITH HER PRIMARY CARE PROVIDER FOR YEARLY PHYSICAL EXAMS TO ENSURE COMPLETE HEALTH MAINTENANCE        BMI for Adults  What is BMI?  Body mass index (BMI) is a number that is calculated from a person's weight and height. BMI can help estimate how much of a person's weight is composed of fat. BMI does not measure body fat directly. Rather, it is an alternative to procedures that directly measure body fat, which can be difficult and expensive.  BMI can help identify people who may be at higher risk for certain medical problems.  What are BMI measurements used for?  BMI is used as a screening tool to identify possible weight problems. It helps determine whether a person is obese, overweight, a healthy weight, or underweight.  BMI is useful for:  · Identifying a weight problem that may be related to a medical condition or may increase the risk for medical problems.  · Promoting changes, such as changes in diet and exercise, to help reach a healthy weight. BMI screening can be repeated to see if these changes are working.  How is BMI calculated?  BMI involves measuring your weight in relation to your height. Both height and weight are measured, and the BMI is calculated from those numbers. This can be done either in English ([place] or metric measurements. Note that charts and online BMI calculators are available to help you find your BMI quickly and easily without having to do these calculations yourself.  To calculate your BMI in English ([place] measurements:    1. Measure your weight in pounds (lb).  2. Multiply the number of pounds by 703.  ? For example, for a person who weighs 180 lb, multiply that number by 703, which equals 126,540.  3. Measure your height in inches. Then multiply that number by itself to get a measurement called \"inches squared.\"  ? For example, for a person who is 70 inches tall, the \"inches squared\" measurement is 70 inches x 70 inches, which equals 4,900 inches " "squared.  4. Divide the total from step 2 (number of lb x 703) by the total from step 3 (inches squared): 126,540 ÷ 4,900 = 25.8. This is your BMI.  To calculate your BMI in metric measurements:  1. Measure your weight in kilograms (kg).  2. Measure your height in meters (m). Then multiply that number by itself to get a measurement called \"meters squared.\"  ? For example, for a person who is 1.75 m tall, the \"meters squared\" measurement is 1.75 m x 1.75 m, which is equal to 3.1 meters squared.  3. Divide the number of kilograms (your weight) by the meters squared number. In this example: 70 ÷ 3.1 = 22.6. This is your BMI.  What do the results mean?  BMI charts are used to identify whether you are underweight, normal weight, overweight, or obese. The following guidelines will be used:  · Underweight: BMI less than 18.5.  · Normal weight: BMI between 18.5 and 24.9.  · Overweight: BMI between 25 and 29.9.  · Obese: BMI of 30 or above.  Keep these notes in mind:  · Weight includes both fat and muscle, so someone with a muscular build, such as an athlete, may have a BMI that is higher than 24.9. In cases like these, BMI is not an accurate measure of body fat.  · To determine if excess body fat is the cause of a BMI of 25 or higher, further assessments may need to be done by a health care provider.  · BMI is usually interpreted in the same way for men and women.  Where to find more information  For more information about BMI, including tools to quickly calculate your BMI, go to these websites:  · Centers for Disease Control and Prevention: www.cdc.gov  · American Heart Association: www.heart.org  · National Heart, Lung, and Blood [place]: www.nhlbi.nih.gov  Summary  · Body mass index (BMI) is a number that is calculated from a person's weight and height.  · BMI may help estimate how much of a person's weight is composed of fat. BMI can help identify those who may be at higher risk for certain medical problems.  · BMI " "can be measured using English measurements or metric measurements.  · BMI charts are used to identify whether you are underweight, normal weight, overweight, or obese.  This information is not intended to replace advice given to you by your health care provider. Make sure you discuss any questions you have with your health care provider.  Document Revised: 09/09/2020 Document Reviewed: 07/17/2020  [employer] Patient Education © 2020 [employer]      DASH Eating Plan  DASH stands for \"Dietary Approaches to Stop Hypertension.\" The DASH eating plan is a healthy eating plan that has been shown to reduce high blood pressure (hypertension). It may also reduce your risk for type 2 diabetes, heart disease, and stroke. The DASH eating plan may also help with weight loss.  What are tips for following this plan?    General guidelines  · Avoid eating more than 2,300 mg (milligrams) of salt (sodium) a day. If you have hypertension, you may need to reduce your sodium intake to 1,500 mg a day.  · Limit alcohol intake to no more than 1 drink a day for nonpregnant women and 2 drinks a day for men. One drink equals 12 oz of beer, 5 oz of wine, or 1½ oz of hard liquor.  · Work with your health care provider to maintain a healthy body weight or to lose weight. Ask what an ideal weight is for you.  · Get at least 30 minutes of exercise that causes your heart to beat faster (aerobic exercise) most days of the week. Activities may include walking, swimming, or biking.  · Work with your health care provider or diet and nutrition specialist (dietitian) to adjust your eating plan to your individual calorie needs.  Reading food labels    · Check food labels for the amount of sodium per serving. Choose foods with less than 5 percent of the Daily Value of sodium. Generally, foods with less than 300 mg of sodium per serving fit into this eating plan.  · To find whole grains, look for the word \"whole\" as the first word in the ingredient " "list.  Shopping  · Buy products labeled as \"low-sodium\" or \"no salt added.\"  · Buy fresh foods. Avoid canned foods and premade or frozen meals.  Cooking  · Avoid adding salt when cooking. Use salt-free seasonings or herbs instead of table salt or sea salt. Check with your health care provider or pharmacist before using salt substitutes.  · Do not rdz foods. Cook foods using healthy methods such as baking, boiling, grilling, and broiling instead.  · Cook with heart-healthy oils, such as olive, canola, soybean, or sunflower oil.  Meal planning  · Eat a balanced diet that includes:  ? 5 or more servings of fruits and vegetables each day. At each meal, try to fill half of your plate with fruits and vegetables.  ? Up to 6-8 servings of whole grains each day.  ? Less than 6 oz of lean meat, poultry, or fish each day. A 3-oz serving of meat is about the same size as a deck of cards. One egg equals 1 oz.  ? 2 servings of low-fat dairy each day.  ? A serving of nuts, seeds, or beans 5 times each week.  ? Heart-healthy fats. Healthy fats called Omega-3 fatty acids are found in foods such as flaxseeds and coldwater fish, like sardines, salmon, and mackerel.  · Limit how much you eat of the following:  ? Canned or prepackaged foods.  ? Food that is high in trans fat, such as fried foods.  ? Food that is high in saturated fat, such as fatty meat.  ? Sweets, desserts, sugary drinks, and other foods with added sugar.  ? Full-fat dairy products.  · Do not salt foods before eating.  · Try to eat at least 2 vegetarian meals each week.  · Eat more home-cooked food and less restaurant, buffet, and fast food.  · When eating at a restaurant, ask that your food be prepared with less salt or no salt, if possible.  What foods are recommended?  The items listed may not be a complete list. Talk with your dietitian about what dietary choices are best for you.  Grains  Whole-grain or whole-wheat bread. Whole-grain or whole-wheat pasta. Brown " rice. Oatmeal. Quinoa. Bulgur. Whole-grain and low-sodium cereals. [place] bread. Low-fat, low-sodium crackers. Whole-wheat flour tortillas.  Vegetables  Fresh or frozen vegetables (raw, steamed, roasted, or grilled). Low-sodium or reduced-sodium tomato and vegetable juice. Low-sodium or reduced-sodium tomato sauce and tomato paste. Low-sodium or reduced-sodium canned vegetables.  Fruits  All fresh, dried, or frozen fruit. Canned fruit in natural juice (without added sugar).  Meat and other protein foods  Skinless chicken or turkey. Ground chicken or turkey. Pork with fat trimmed off. Fish and seafood. Egg whites. Dried beans, peas, or lentils. Unsalted nuts, nut butters, and seeds. Unsalted canned beans. Lean cuts of beef with fat trimmed off. Low-sodium, lean deli meat.  Dairy  Low-fat (1%) or fat-free (skim) milk. Fat-free, low-fat, or reduced-fat cheeses. Nonfat, low-sodium ricotta or cottage cheese. Low-fat or nonfat yogurt. Low-fat, low-sodium cheese.  Fats and oils  Soft margarine without trans fats. Vegetable oil. Low-fat, reduced-fat, or light mayonnaise and salad dressings (reduced-sodium). Canola, safflower, olive, soybean, and sunflower oils. Avocado.  Seasoning and other foods  Herbs. Spices. Seasoning mixes without salt. Unsalted popcorn and pretzels. Fat-free sweets.  What foods are not recommended?  The items listed may not be a complete list. Talk with your dietitian about what dietary choices are best for you.  Grains  Baked goods made with fat, such as croissants, muffins, or some breads. Dry pasta or rice meal packs.  Vegetables  Creamed or fried vegetables. Vegetables in a cheese sauce. Regular canned vegetables (not low-sodium or reduced-sodium). Regular canned tomato sauce and paste (not low-sodium or reduced-sodium). Regular tomato and vegetable juice (not low-sodium or reduced-sodium). Pickles. Olives.  Fruits  Canned fruit in a light or heavy syrup. Fried fruit. Fruit in cream or butter  sauce.  Meat and other protein foods  Fatty cuts of meat. Ribs. Fried meat. Matthew. Sausage. Bologna and other processed lunch meats. Salami. Fatback. Hotdogs. Bratwurst. Salted nuts and seeds. Canned beans with added salt. Canned or smoked fish. Whole eggs or egg yolks. Chicken or turkey with skin.  Dairy  Whole or 2% milk, cream, and half-and-half. Whole or full-fat cream cheese. Whole-fat or sweetened yogurt. Full-fat cheese. Nondairy creamers. Whipped toppings. Processed cheese and cheese spreads.  Fats and oils  Butter. Stick margarine. Lard. Shortening. Ghee. Matthew fat. Tropical oils, such as coconut, palm kernel, or palm oil.  Seasoning and other foods  Salted popcorn and pretzels. Onion salt, garlic salt, seasoned salt, table salt, and sea salt. Worcestershire sauce. Tartar sauce. Barbecue sauce. Teriyaki sauce. Soy sauce, including reduced-sodium. Steak sauce. Canned and packaged gravies. Fish sauce. Oyster sauce. Cocktail sauce. Horseradish that you find on the shelf. Ketchup. Mustard. Meat flavorings and tenderizers. Bouillon cubes. Hot sauce and Tabasco sauce. Premade or packaged marinades. Premade or packaged taco seasonings. Relishes. Regular salad dressings.  Where to find more information:  · National Heart, Lung, and Blood [place]: www.nhlbi.nih.gov  · American Heart Association: www.heart.org  Summary  · The DASH eating plan is a healthy eating plan that has been shown to reduce high blood pressure (hypertension). It may also reduce your risk for type 2 diabetes, heart disease, and stroke.  · With the DASH eating plan, you should limit salt (sodium) intake to 2,300 mg a day. If you have hypertension, you may need to reduce your sodium intake to 1,500 mg a day.  · When on the DASH eating plan, aim to eat more fresh fruits and vegetables, whole grains, lean proteins, low-fat dairy, and heart-healthy fats.  · Work with your health care provider or diet and nutrition specialist (dietitian) to adjust  your eating plan to your individual calorie needs.  This information is not intended to replace advice given to you by your health care provider. Make sure you discuss any questions you have with your health care provider.  Document Revised: 11/30/2018 Document Reviewed: 12/11/2017  [employer]vier Patient Education © 2020 [employer]

## 2021-09-29 ENCOUNTER — OFFICE VISIT (OUTPATIENT)
Dept: CARDIOLOGY | Facility: CLINIC | Age: 31
End: 2021-09-29

## 2021-09-29 VITALS
OXYGEN SATURATION: 99 % | BODY MASS INDEX: 29.78 KG/M2 | WEIGHT: 208 LBS | HEART RATE: 78 BPM | HEIGHT: 70 IN | SYSTOLIC BLOOD PRESSURE: 136 MMHG | DIASTOLIC BLOOD PRESSURE: 60 MMHG

## 2021-09-29 DIAGNOSIS — I10 ESSENTIAL HYPERTENSION: ICD-10-CM

## 2021-09-29 DIAGNOSIS — E66.3 OVERWEIGHT WITH BODY MASS INDEX (BMI) OF 29 TO 29.9 IN ADULT: ICD-10-CM

## 2021-09-29 DIAGNOSIS — Z95.2 H/O PULMONIC VALVE REPLACEMENT: Primary | ICD-10-CM

## 2021-09-29 DIAGNOSIS — E78.5 DYSLIPIDEMIA: ICD-10-CM

## 2021-09-29 DIAGNOSIS — I35.1 NONRHEUMATIC AORTIC VALVE INSUFFICIENCY: ICD-10-CM

## 2021-09-29 PROBLEM — E66.09 CLASS 1 OBESITY DUE TO EXCESS CALORIES WITH SERIOUS COMORBIDITY AND BODY MASS INDEX (BMI) OF 30.0 TO 30.9 IN ADULT: Status: RESOLVED | Noted: 2020-09-24 | Resolved: 2021-09-29

## 2021-09-29 PROBLEM — N18.32 STAGE 3B CHRONIC KIDNEY DISEASE (HCC): Status: ACTIVE | Noted: 2017-12-20

## 2021-09-29 PROCEDURE — 93000 ELECTROCARDIOGRAM COMPLETE: CPT | Performed by: INTERNAL MEDICINE

## 2021-09-29 PROCEDURE — 99214 OFFICE O/P EST MOD 30 MIN: CPT | Performed by: INTERNAL MEDICINE

## 2021-09-29 RX ORDER — TRAZODONE HYDROCHLORIDE 100 MG/1
100 TABLET ORAL
COMMUNITY
Start: 2021-08-05

## 2021-09-29 NOTE — PROGRESS NOTES
Reason for Visit: cardiovascular follow up.    HPI:  Nevaeh Atkinson is a 31 y.o. female is here today for follow-up. She follows in cardiology clinic due to her history of pulmonic valve replacement.  She has been feeling well and not having any issues or complaints.  She feels like she eats relatively healthy but does note some room for improvement.  Her last lipid panel from a year ago showed significant elevations in her total cholesterol and LDL cholesterol.  She reports having a more recent one about a week or 2 ago.  She denies any cardiac symptoms and specifically denies any chest pain, palpitations, dizziness, syncope, PND, or orthopnea.  She is active and has no problems with physical exertion.    Previous Cardiac Testing and Procedures:  -Echo (05/25/2016) EF 60%, grade 2 diastolic filling pattern, mild aortic stenosis with mean gradient 14 mmHg, EB 1.76 cm², mild AI, mild MR, bioprosthetic pulmonary valve with peak gradient 15 mmHg, RVSP 35 mmHg  -Echo (06/29/2017) EF 65%, normal diastolic function, mild aortic stenosis with mean gradient 6 mmHg, mild aortic regurgitation, mild pulmonic regurgitation, bioprosthetic pulmonic valve with peak gradient 9 mmHg, mean gradient 5 mmHg, mild TR  -Echo (8/6/2019) EF 65-70%, mild AI, pulmonic valve not well visualized, trivial PI, peak pulmonic gradient 6 mmHg, mean gradient 3 mmHg  -Lipid panel (9/3/2020) total cholesterol 244, HDL 49, , triglycerides 169  -BMP (9/3/2020) creatinine 2.07, GFR 31, BUN 43, potassium 4.7, sodium 139    Patient Active Problem List   Diagnosis   • H/O pulmonic valve replacement   • Peptic ulcer disease   • Anxiety   • Eclampsia, delivered   • Stage 3b chronic kidney disease (CMS/HCC)   • Essential hypertension   • Aortic valve regurgitation   • Disc displacement, lumbar   • Scoliosis of lumbar spine   • Overweight with body mass index (BMI) of 29 to 29.9 in adult   • Non-smoker   • Dyslipidemia       Social History     Tobacco  "Use   • Smoking status: Never Smoker   • Smokeless tobacco: Never Used   Vaping Use   • Vaping Use: Never used   Substance Use Topics   • Alcohol use: No   • Drug use: No       Family History   Adopted: Yes       The following portions of the patient's history were reviewed and updated as appropriate: allergies, current medications, past family history, past medical history, past social history, past surgical history and problem list.      Current Outpatient Medications:   •  FLUoxetine (PROzac) 10 MG capsule, Take 10 mg by mouth Daily., Disp: , Rfl:   •  irbesartan (AVAPRO) 75 MG tablet, Take 75 mg by mouth Every Morning., Disp: , Rfl: 0  •  loratadine (CLARITIN) 10 MG tablet, Take 10 mg by mouth Daily., Disp: , Rfl:   •  traZODone (DESYREL) 100 MG tablet, Take 100 mg by mouth every night at bedtime., Disp: , Rfl:   •  furosemide (LASIX) 40 MG tablet, Take 40 mg by mouth As Needed., Disp: , Rfl: 0    Review of Systems   Constitutional: Negative for chills and fever.   Cardiovascular: Negative for chest pain, dyspnea on exertion, irregular heartbeat, paroxysmal nocturnal dyspnea and syncope.   Respiratory: Negative for cough and shortness of breath.    Skin: Negative for rash.   Gastrointestinal: Negative for abdominal pain and heartburn.   Neurological: Negative for dizziness and numbness.       Objective   /60 (BP Location: Left arm, Patient Position: Sitting, Cuff Size: Adult)   Pulse 78   Ht 177.8 cm (70\")   Wt 94.3 kg (208 lb)   SpO2 99%   BMI 29.84 kg/m²   Constitutional:       Appearance: Well-developed.   HENT:      Head: Normocephalic and atraumatic.   Pulmonary:      Effort: Pulmonary effort is normal.      Breath sounds: Normal breath sounds.   Cardiovascular:      Normal rate. Regular rhythm.      Murmurs: There is a grade 2/6 holosystolic murmur.      No gallop. No click.   Edema:     Peripheral edema absent.   Skin:     General: Skin is warm and dry.   Neurological:      Mental Status: Alert " and oriented to person, place, and time.         ECG 12 Lead    Date/Time: 9/29/2021 9:51 AM  Performed by: Severiano Gamino MD  Authorized by: Severiano Gamino MD   Previous ECG: no previous ECG available  Rhythm: sinus rhythm  Rate: normal  Other findings: non-specific ST-T wave changes and left ventricular hypertrophy              ICD-10-CM ICD-9-CM   1. H/O pulmonic valve replacement  Z95.2 V43.3   2. Nonrheumatic aortic valve insufficiency  I35.1 424.1   3. Essential hypertension  I10 401.9   4. Dyslipidemia  E78.5 272.4   5. Overweight with body mass index (BMI) of 29 to 29.9 in adult  E66.3 278.02    Z68.29 V85.25         Assessment/Plan:  1.  Pulmonic valve replacement: Repeat echo from 8/6/2019 demonstrated stable gradient with only trivial PI.  Continue routine surveillance with plans repeat echo in the next 1 to 2 years.    2. Aortic regurgitation: Mild  AI on last echo from 8/2019 with no evidence of stenosis.    Continue to monitor.     3.  Essential hypertension: Blood pressure is acceptable today.  Continue irbesartan.     4.  Dyslipidemia: Significant elevations in her total and LDL cholesterol on lipid panel from 9/2020.  Recommend trial of lifestyle modification and will obtain copy of most recent lipid panel.    5.  Overweight: Patient's Body mass index is 29.84 kg/m². indicating that she is overweight (BMI 25-29.9). Obesity-related health conditions include the following: hypertension and dyslipidemias. Obesity is improving with lifestyle modifications. BMI is is above average; BMI management plan is completed. We discussed portion control and increasing exercise.

## 2022-08-07 ENCOUNTER — HOSPITAL ENCOUNTER (INPATIENT)
Age: 32
LOS: 1 days | Discharge: HOME | DRG: 305 | End: 2022-08-10
Attending: EMERGENCY MEDICINE | Admitting: HOSPITALIST
Payer: COMMERCIAL

## 2022-08-07 DIAGNOSIS — N18.4 STAGE 4 CHRONIC KIDNEY DISEASE (HCC): ICD-10-CM

## 2022-08-07 DIAGNOSIS — I16.0 HYPERTENSIVE URGENCY: Primary | ICD-10-CM

## 2022-08-07 DIAGNOSIS — Z95.2 HISTORY OF PROSTHETIC PULMONIC VALVE REPLACEMENT: ICD-10-CM

## 2022-08-07 PROBLEM — F32.A DEPRESSION: Status: ACTIVE | Noted: 2022-08-07

## 2022-08-07 PROBLEM — R55 NEAR SYNCOPE: Status: ACTIVE | Noted: 2022-08-07

## 2022-08-07 PROBLEM — D64.9 ANEMIA: Status: ACTIVE | Noted: 2022-08-07

## 2022-08-07 PROBLEM — I25.10 CAD (CORONARY ARTERY DISEASE): Status: ACTIVE | Noted: 2022-08-07

## 2022-08-07 PROBLEM — I37.0 PULMONARY STENOSIS: Status: ACTIVE | Noted: 2022-08-07

## 2022-08-07 PROBLEM — F41.9 ANXIETY: Status: ACTIVE | Noted: 2022-08-07

## 2022-08-07 PROBLEM — M54.9 BACK PAIN: Status: ACTIVE | Noted: 2022-08-07

## 2022-08-07 PROBLEM — I10 HYPERTENSION: Status: ACTIVE | Noted: 2022-08-07

## 2022-08-07 PROBLEM — M79.7 FIBROMYALGIA: Status: ACTIVE | Noted: 2022-08-07

## 2022-08-07 LAB
ALBUMIN SERPL-MCNC: 4.5 G/DL (ref 3.5–5.2)
ALP BLD-CCNC: 108 U/L (ref 35–104)
ALT SERPL-CCNC: 25 U/L (ref 5–33)
ANION GAP SERPL CALCULATED.3IONS-SCNC: 11 MMOL/L (ref 7–19)
AST SERPL-CCNC: 21 U/L (ref 5–32)
BACTERIA: NEGATIVE /HPF
BASOPHILS ABSOLUTE: 0.1 K/UL (ref 0–0.2)
BASOPHILS RELATIVE PERCENT: 0.8 % (ref 0–1)
BILIRUB SERPL-MCNC: <0.2 MG/DL (ref 0.2–1.2)
BILIRUBIN URINE: NEGATIVE
BLOOD, URINE: NEGATIVE
BUN BLDV-MCNC: 36 MG/DL (ref 6–20)
CALCIUM SERPL-MCNC: 9.5 MG/DL (ref 8.6–10)
CHLORIDE BLD-SCNC: 105 MMOL/L (ref 98–111)
CHOLESTEROL, TOTAL: 246 MG/DL (ref 160–199)
CLARITY: CLEAR
CO2: 23 MMOL/L (ref 22–29)
COLOR: YELLOW
CREAT SERPL-MCNC: 2.3 MG/DL (ref 0.5–0.9)
CRYSTALS, UA: ABNORMAL /HPF
EOSINOPHILS ABSOLUTE: 0.2 K/UL (ref 0–0.6)
EOSINOPHILS RELATIVE PERCENT: 3.6 % (ref 0–5)
EPITHELIAL CELLS, UA: 1 /HPF (ref 0–5)
GFR AFRICAN AMERICAN: 29
GFR NON-AFRICAN AMERICAN: 24
GLUCOSE BLD-MCNC: 103 MG/DL (ref 74–109)
GLUCOSE URINE: NEGATIVE MG/DL
HBA1C MFR BLD: 5.2 % (ref 4–6)
HCG(URINE) PREGNANCY TEST: NEGATIVE
HCT VFR BLD CALC: 41.8 % (ref 37–47)
HDLC SERPL-MCNC: 44 MG/DL (ref 65–121)
HEMOGLOBIN: 13.5 G/DL (ref 12–16)
HYALINE CASTS: 0 /HPF (ref 0–8)
IMMATURE GRANULOCYTES #: 0.1 K/UL
KETONES, URINE: NEGATIVE MG/DL
LDL CHOLESTEROL CALCULATED: 151 MG/DL
LEUKOCYTE ESTERASE, URINE: NEGATIVE
LYMPHOCYTES ABSOLUTE: 1.8 K/UL (ref 1.1–4.5)
LYMPHOCYTES RELATIVE PERCENT: 28.1 % (ref 20–40)
MAGNESIUM: 2.3 MG/DL (ref 1.6–2.6)
MCH RBC QN AUTO: 28.7 PG (ref 27–31)
MCHC RBC AUTO-ENTMCNC: 32.3 G/DL (ref 33–37)
MCV RBC AUTO: 88.9 FL (ref 81–99)
MONOCYTES ABSOLUTE: 0.5 K/UL (ref 0–0.9)
MONOCYTES RELATIVE PERCENT: 7.7 % (ref 0–10)
NEUTROPHILS ABSOLUTE: 3.8 K/UL (ref 1.5–7.5)
NEUTROPHILS RELATIVE PERCENT: 58.9 % (ref 50–65)
NITRITE, URINE: NEGATIVE
PDW BLD-RTO: 13.2 % (ref 11.5–14.5)
PH UA: 5.5 (ref 5–8)
PHOSPHORUS: 3.9 MG/DL (ref 2.5–4.5)
PLATELET # BLD: 225 K/UL (ref 130–400)
PMV BLD AUTO: 9.5 FL (ref 9.4–12.3)
POTASSIUM REFLEX MAGNESIUM: 4.2 MMOL/L (ref 3.5–5)
PROTEIN UA: 300 MG/DL
RBC # BLD: 4.7 M/UL (ref 4.2–5.4)
RBC UA: 1 /HPF (ref 0–4)
SARS-COV-2, NAAT: NOT DETECTED
SODIUM BLD-SCNC: 139 MMOL/L (ref 136–145)
SPECIFIC GRAVITY UA: 1.01 (ref 1–1.03)
T4 FREE: 1.01 NG/DL (ref 0.93–1.7)
TOTAL PROTEIN: 7.5 G/DL (ref 6.6–8.7)
TRIGL SERPL-MCNC: 255 MG/DL (ref 0–149)
TROPONIN: <0.01 NG/ML (ref 0–0.03)
TSH REFLEX FT4: 5.23 UIU/ML (ref 0.35–5.5)
UROBILINOGEN, URINE: 0.2 E.U./DL
WBC # BLD: 6.4 K/UL (ref 4.8–10.8)
WBC UA: 2 /HPF (ref 0–5)

## 2022-08-07 PROCEDURE — 84439 ASSAY OF FREE THYROXINE: CPT

## 2022-08-07 PROCEDURE — 99285 EMERGENCY DEPT VISIT HI MDM: CPT

## 2022-08-07 PROCEDURE — 80061 LIPID PANEL: CPT

## 2022-08-07 PROCEDURE — 83036 HEMOGLOBIN GLYCOSYLATED A1C: CPT

## 2022-08-07 PROCEDURE — 36415 COLL VENOUS BLD VENIPUNCTURE: CPT

## 2022-08-07 PROCEDURE — 84703 CHORIONIC GONADOTROPIN ASSAY: CPT

## 2022-08-07 PROCEDURE — 84100 ASSAY OF PHOSPHORUS: CPT

## 2022-08-07 PROCEDURE — 83735 ASSAY OF MAGNESIUM: CPT

## 2022-08-07 PROCEDURE — 84484 ASSAY OF TROPONIN QUANT: CPT

## 2022-08-07 PROCEDURE — 6360000002 HC RX W HCPCS: Performed by: EMERGENCY MEDICINE

## 2022-08-07 PROCEDURE — 85025 COMPLETE CBC W/AUTO DIFF WBC: CPT

## 2022-08-07 PROCEDURE — 81001 URINALYSIS AUTO W/SCOPE: CPT

## 2022-08-07 PROCEDURE — G0378 HOSPITAL OBSERVATION PER HR: HCPCS

## 2022-08-07 PROCEDURE — 80053 COMPREHEN METABOLIC PANEL: CPT

## 2022-08-07 PROCEDURE — 93005 ELECTROCARDIOGRAM TRACING: CPT | Performed by: EMERGENCY MEDICINE

## 2022-08-07 PROCEDURE — 87635 SARS-COV-2 COVID-19 AMP PRB: CPT

## 2022-08-07 PROCEDURE — 96374 THER/PROPH/DIAG INJ IV PUSH: CPT

## 2022-08-07 PROCEDURE — 84443 ASSAY THYROID STIM HORMONE: CPT

## 2022-08-07 RX ORDER — IRBESARTAN 300 MG/1
300 TABLET ORAL NIGHTLY
COMMUNITY

## 2022-08-07 RX ORDER — ONDANSETRON 4 MG/1
4 TABLET, ORALLY DISINTEGRATING ORAL EVERY 8 HOURS PRN
Status: DISCONTINUED | OUTPATIENT
Start: 2022-08-07 | End: 2022-08-08

## 2022-08-07 RX ORDER — LORATADINE 10 MG/1
10 CAPSULE, LIQUID FILLED ORAL DAILY
COMMUNITY

## 2022-08-07 RX ORDER — MECOBALAMIN 5000 MCG
5 TABLET,DISINTEGRATING ORAL NIGHTLY PRN
Status: DISCONTINUED | OUTPATIENT
Start: 2022-08-07 | End: 2022-08-10 | Stop reason: HOSPADM

## 2022-08-07 RX ORDER — FAMOTIDINE 40 MG/1
40 TABLET, FILM COATED ORAL DAILY
COMMUNITY

## 2022-08-07 RX ORDER — LOSARTAN POTASSIUM 50 MG/1
100 TABLET ORAL DAILY
Status: DISCONTINUED | OUTPATIENT
Start: 2022-08-08 | End: 2022-08-10 | Stop reason: HOSPADM

## 2022-08-07 RX ORDER — ENOXAPARIN SODIUM 100 MG/ML
40 INJECTION SUBCUTANEOUS DAILY
Status: DISCONTINUED | OUTPATIENT
Start: 2022-08-08 | End: 2022-08-10 | Stop reason: HOSPADM

## 2022-08-07 RX ORDER — BUPROPION HYDROCHLORIDE 300 MG/1
300 TABLET ORAL EVERY MORNING
COMMUNITY

## 2022-08-07 RX ORDER — ONDANSETRON 2 MG/ML
4 INJECTION INTRAMUSCULAR; INTRAVENOUS EVERY 6 HOURS PRN
Status: DISCONTINUED | OUTPATIENT
Start: 2022-08-07 | End: 2022-08-08

## 2022-08-07 RX ORDER — FAMOTIDINE 20 MG/1
20 TABLET, FILM COATED ORAL DAILY
Status: DISCONTINUED | OUTPATIENT
Start: 2022-08-08 | End: 2022-08-08

## 2022-08-07 RX ORDER — SODIUM CHLORIDE 0.9 % (FLUSH) 0.9 %
5-40 SYRINGE (ML) INJECTION EVERY 12 HOURS SCHEDULED
Status: DISCONTINUED | OUTPATIENT
Start: 2022-08-07 | End: 2022-08-10 | Stop reason: HOSPADM

## 2022-08-07 RX ORDER — MECOBALAMIN 5000 MCG
5 TABLET,DISINTEGRATING ORAL NIGHTLY
Status: DISCONTINUED | OUTPATIENT
Start: 2022-08-07 | End: 2022-08-10 | Stop reason: HOSPADM

## 2022-08-07 RX ORDER — HYDRALAZINE HYDROCHLORIDE 20 MG/ML
10 INJECTION INTRAMUSCULAR; INTRAVENOUS EVERY 4 HOURS PRN
Status: DISCONTINUED | OUTPATIENT
Start: 2022-08-07 | End: 2022-08-08

## 2022-08-07 RX ORDER — ACETAMINOPHEN 650 MG/1
650 SUPPOSITORY RECTAL EVERY 6 HOURS PRN
Status: DISCONTINUED | OUTPATIENT
Start: 2022-08-07 | End: 2022-08-10 | Stop reason: HOSPADM

## 2022-08-07 RX ORDER — BUPROPION HYDROCHLORIDE 150 MG/1
300 TABLET ORAL EVERY MORNING
Status: DISCONTINUED | OUTPATIENT
Start: 2022-08-08 | End: 2022-08-10 | Stop reason: HOSPADM

## 2022-08-07 RX ORDER — CHOLECALCIFEROL (VITAMIN D3) 125 MCG
5 CAPSULE ORAL DAILY
COMMUNITY

## 2022-08-07 RX ORDER — SODIUM CHLORIDE 9 MG/ML
INJECTION, SOLUTION INTRAVENOUS PRN
Status: DISCONTINUED | OUTPATIENT
Start: 2022-08-07 | End: 2022-08-10 | Stop reason: HOSPADM

## 2022-08-07 RX ORDER — ACETAMINOPHEN 325 MG/1
650 TABLET ORAL EVERY 6 HOURS PRN
Status: DISCONTINUED | OUTPATIENT
Start: 2022-08-07 | End: 2022-08-10 | Stop reason: HOSPADM

## 2022-08-07 RX ORDER — DOXYCYCLINE HYCLATE 50 MG/1
324 CAPSULE, GELATIN COATED ORAL
COMMUNITY

## 2022-08-07 RX ORDER — CLONIDINE HYDROCHLORIDE 0.1 MG/1
0.1 TABLET ORAL
COMMUNITY

## 2022-08-07 RX ORDER — POTASSIUM CHLORIDE 20 MEQ/1
40 TABLET, EXTENDED RELEASE ORAL PRN
Status: DISCONTINUED | OUTPATIENT
Start: 2022-08-07 | End: 2022-08-10 | Stop reason: HOSPADM

## 2022-08-07 RX ORDER — HYDRALAZINE HYDROCHLORIDE 20 MG/ML
10 INJECTION INTRAMUSCULAR; INTRAVENOUS ONCE
Status: COMPLETED | OUTPATIENT
Start: 2022-08-07 | End: 2022-08-07

## 2022-08-07 RX ORDER — CALCIUM CARBONATE 200(500)MG
500 TABLET,CHEWABLE ORAL 3 TIMES DAILY PRN
Status: DISCONTINUED | OUTPATIENT
Start: 2022-08-07 | End: 2022-08-10 | Stop reason: HOSPADM

## 2022-08-07 RX ORDER — TRAZODONE HYDROCHLORIDE 50 MG/1
100 TABLET ORAL NIGHTLY
Status: DISCONTINUED | OUTPATIENT
Start: 2022-08-07 | End: 2022-08-10 | Stop reason: HOSPADM

## 2022-08-07 RX ORDER — FERROUS GLUCONATE 324(37.5)
324 TABLET ORAL
Status: DISCONTINUED | OUTPATIENT
Start: 2022-08-08 | End: 2022-08-10 | Stop reason: HOSPADM

## 2022-08-07 RX ORDER — VITAMIN B COMPLEX
2000 TABLET ORAL DAILY
Status: DISCONTINUED | OUTPATIENT
Start: 2022-08-08 | End: 2022-08-10 | Stop reason: HOSPADM

## 2022-08-07 RX ORDER — SODIUM CHLORIDE 0.9 % (FLUSH) 0.9 %
5-40 SYRINGE (ML) INJECTION PRN
Status: DISCONTINUED | OUTPATIENT
Start: 2022-08-07 | End: 2022-08-10 | Stop reason: HOSPADM

## 2022-08-07 RX ORDER — POLYETHYLENE GLYCOL 3350 17 G/17G
17 POWDER, FOR SOLUTION ORAL DAILY PRN
Status: DISCONTINUED | OUTPATIENT
Start: 2022-08-07 | End: 2022-08-10 | Stop reason: HOSPADM

## 2022-08-07 RX ORDER — MAGNESIUM SULFATE IN WATER 40 MG/ML
2000 INJECTION, SOLUTION INTRAVENOUS PRN
Status: DISCONTINUED | OUTPATIENT
Start: 2022-08-07 | End: 2022-08-10 | Stop reason: HOSPADM

## 2022-08-07 RX ORDER — LABETALOL HYDROCHLORIDE 5 MG/ML
10 INJECTION, SOLUTION INTRAVENOUS EVERY 4 HOURS PRN
Status: DISCONTINUED | OUTPATIENT
Start: 2022-08-07 | End: 2022-08-08

## 2022-08-07 RX ORDER — POTASSIUM CHLORIDE 7.45 MG/ML
10 INJECTION INTRAVENOUS PRN
Status: DISCONTINUED | OUTPATIENT
Start: 2022-08-07 | End: 2022-08-10 | Stop reason: HOSPADM

## 2022-08-07 RX ORDER — FAMOTIDINE 20 MG/1
40 TABLET, FILM COATED ORAL DAILY
Status: DISCONTINUED | OUTPATIENT
Start: 2022-08-08 | End: 2022-08-07 | Stop reason: DRUGHIGH

## 2022-08-07 RX ORDER — TRAZODONE HYDROCHLORIDE 100 MG/1
100 TABLET ORAL NIGHTLY
COMMUNITY

## 2022-08-07 RX ADMIN — HYDRALAZINE HYDROCHLORIDE 10 MG: 20 INJECTION, SOLUTION INTRAMUSCULAR; INTRAVENOUS at 21:22

## 2022-08-07 ASSESSMENT — ENCOUNTER SYMPTOMS
RHINORRHEA: 0
EYE REDNESS: 0
VOICE CHANGE: 0
NAUSEA: 0
COUGH: 0
ABDOMINAL PAIN: 0
VOMITING: 0
DIARRHEA: 0
EYE PAIN: 0
SHORTNESS OF BREATH: 0

## 2022-08-07 ASSESSMENT — PAIN - FUNCTIONAL ASSESSMENT: PAIN_FUNCTIONAL_ASSESSMENT: PREVENTS OR INTERFERES SOME ACTIVE ACTIVITIES AND ADLS

## 2022-08-07 ASSESSMENT — PAIN DESCRIPTION - DESCRIPTORS: DESCRIPTORS: DULL;ACHING

## 2022-08-07 ASSESSMENT — PAIN SCALES - GENERAL
PAINLEVEL_OUTOF10: 0
PAINLEVEL_OUTOF10: 3

## 2022-08-07 ASSESSMENT — PAIN SCALES - WONG BAKER
WONGBAKER_NUMERICALRESPONSE: 0
WONGBAKER_NUMERICALRESPONSE: 0

## 2022-08-07 ASSESSMENT — PAIN DESCRIPTION - ORIENTATION: ORIENTATION: MID

## 2022-08-07 ASSESSMENT — PAIN DESCRIPTION - LOCATION: LOCATION: HEAD

## 2022-08-08 LAB
ANION GAP SERPL CALCULATED.3IONS-SCNC: 11 MMOL/L (ref 7–19)
BASOPHILS ABSOLUTE: 0.1 K/UL (ref 0–0.2)
BASOPHILS RELATIVE PERCENT: 0.7 % (ref 0–1)
BUN BLDV-MCNC: 34 MG/DL (ref 6–20)
CALCIUM SERPL-MCNC: 9.3 MG/DL (ref 8.6–10)
CHLORIDE BLD-SCNC: 107 MMOL/L (ref 98–111)
CO2: 21 MMOL/L (ref 22–29)
CREAT SERPL-MCNC: 2.2 MG/DL (ref 0.5–0.9)
EKG P AXIS: 57 DEGREES
EKG P-R INTERVAL: 152 MS
EKG Q-T INTERVAL: 390 MS
EKG QRS DURATION: 88 MS
EKG QTC CALCULATION (BAZETT): 397 MS
EKG T AXIS: 87 DEGREES
EOSINOPHILS ABSOLUTE: 0.2 K/UL (ref 0–0.6)
EOSINOPHILS RELATIVE PERCENT: 2.7 % (ref 0–5)
FERRITIN: 55.6 NG/ML (ref 13–150)
FOLATE: 14.8 NG/ML (ref 4.8–37.3)
GFR AFRICAN AMERICAN: 31
GFR NON-AFRICAN AMERICAN: 26
GLUCOSE BLD-MCNC: 99 MG/DL (ref 74–109)
HCT VFR BLD CALC: 30 % (ref 37–47)
HEMOGLOBIN: 10.1 G/DL (ref 12–16)
IMMATURE GRANULOCYTES #: 0.1 K/UL
IRON SATURATION: 23 % (ref 14–50)
IRON: 61 UG/DL (ref 37–145)
LV EF: 60 %
LVEF MODALITY: NORMAL
LYMPHOCYTES ABSOLUTE: 1.9 K/UL (ref 1.1–4.5)
LYMPHOCYTES RELATIVE PERCENT: 28 % (ref 20–40)
MCH RBC QN AUTO: 28.9 PG (ref 27–31)
MCHC RBC AUTO-ENTMCNC: 33.7 G/DL (ref 33–37)
MCV RBC AUTO: 86 FL (ref 81–99)
MONOCYTES ABSOLUTE: 0.6 K/UL (ref 0–0.9)
MONOCYTES RELATIVE PERCENT: 9.1 % (ref 0–10)
NEUTROPHILS ABSOLUTE: 4.1 K/UL (ref 1.5–7.5)
NEUTROPHILS RELATIVE PERCENT: 58.6 % (ref 50–65)
PARATHYROID HORMONE INTACT: 64.9 PG/ML (ref 15–65)
PDW BLD-RTO: 13.2 % (ref 11.5–14.5)
PLATELET # BLD: 198 K/UL (ref 130–400)
PMV BLD AUTO: 10.2 FL (ref 9.4–12.3)
POTASSIUM REFLEX MAGNESIUM: 4.8 MMOL/L (ref 3.5–5)
RBC # BLD: 3.49 M/UL (ref 4.2–5.4)
SODIUM BLD-SCNC: 139 MMOL/L (ref 136–145)
TOTAL IRON BINDING CAPACITY: 269 UG/DL (ref 250–400)
TROPONIN: <0.01 NG/ML (ref 0–0.03)
TROPONIN: <0.01 NG/ML (ref 0–0.03)
VITAMIN B-12: 553 PG/ML (ref 211–946)
VITAMIN D 25-HYDROXY: 41.8 NG/ML
WBC # BLD: 6.9 K/UL (ref 4.8–10.8)

## 2022-08-08 PROCEDURE — 96375 TX/PRO/DX INJ NEW DRUG ADDON: CPT

## 2022-08-08 PROCEDURE — 93880 EXTRACRANIAL BILAT STUDY: CPT

## 2022-08-08 PROCEDURE — 80048 BASIC METABOLIC PNL TOTAL CA: CPT

## 2022-08-08 PROCEDURE — 6370000000 HC RX 637 (ALT 250 FOR IP): Performed by: INTERNAL MEDICINE

## 2022-08-08 PROCEDURE — 82607 VITAMIN B-12: CPT

## 2022-08-08 PROCEDURE — 2500000003 HC RX 250 WO HCPCS: Performed by: HOSPITALIST

## 2022-08-08 PROCEDURE — 96376 TX/PRO/DX INJ SAME DRUG ADON: CPT

## 2022-08-08 PROCEDURE — 36415 COLL VENOUS BLD VENIPUNCTURE: CPT

## 2022-08-08 PROCEDURE — 2580000003 HC RX 258: Performed by: HOSPITALIST

## 2022-08-08 PROCEDURE — 82088 ASSAY OF ALDOSTERONE: CPT

## 2022-08-08 PROCEDURE — 6360000002 HC RX W HCPCS: Performed by: HOSPITALIST

## 2022-08-08 PROCEDURE — 82746 ASSAY OF FOLIC ACID SERUM: CPT

## 2022-08-08 PROCEDURE — G0378 HOSPITAL OBSERVATION PER HR: HCPCS

## 2022-08-08 PROCEDURE — 6370000000 HC RX 637 (ALT 250 FOR IP): Performed by: HOSPITALIST

## 2022-08-08 PROCEDURE — 83540 ASSAY OF IRON: CPT

## 2022-08-08 PROCEDURE — 84484 ASSAY OF TROPONIN QUANT: CPT

## 2022-08-08 PROCEDURE — 84244 ASSAY OF RENIN: CPT

## 2022-08-08 PROCEDURE — 82306 VITAMIN D 25 HYDROXY: CPT

## 2022-08-08 PROCEDURE — C8929 TTE W OR WO FOL WCON,DOPPLER: HCPCS

## 2022-08-08 PROCEDURE — 82728 ASSAY OF FERRITIN: CPT

## 2022-08-08 PROCEDURE — 6360000004 HC RX CONTRAST MEDICATION: Performed by: HOSPITALIST

## 2022-08-08 PROCEDURE — 83550 IRON BINDING TEST: CPT

## 2022-08-08 PROCEDURE — 83970 ASSAY OF PARATHORMONE: CPT

## 2022-08-08 PROCEDURE — 96372 THER/PROPH/DIAG INJ SC/IM: CPT

## 2022-08-08 PROCEDURE — 93010 ELECTROCARDIOGRAM REPORT: CPT | Performed by: INTERNAL MEDICINE

## 2022-08-08 PROCEDURE — 99222 1ST HOSP IP/OBS MODERATE 55: CPT | Performed by: INTERNAL MEDICINE

## 2022-08-08 PROCEDURE — 85025 COMPLETE CBC W/AUTO DIFF WBC: CPT

## 2022-08-08 RX ORDER — PROMETHAZINE HYDROCHLORIDE 25 MG/ML
6.25 INJECTION, SOLUTION INTRAMUSCULAR; INTRAVENOUS EVERY 6 HOURS PRN
Status: DISCONTINUED | OUTPATIENT
Start: 2022-08-08 | End: 2022-08-10 | Stop reason: HOSPADM

## 2022-08-08 RX ORDER — SODIUM BICARBONATE 650 MG/1
650 TABLET ORAL 4 TIMES DAILY
Status: DISCONTINUED | OUTPATIENT
Start: 2022-08-08 | End: 2022-08-10 | Stop reason: HOSPADM

## 2022-08-08 RX ORDER — HYDROCODONE BITARTRATE AND ACETAMINOPHEN 5; 325 MG/1; MG/1
1 TABLET ORAL EVERY 4 HOURS PRN
Status: DISCONTINUED | OUTPATIENT
Start: 2022-08-08 | End: 2022-08-08

## 2022-08-08 RX ORDER — LABETALOL HYDROCHLORIDE 5 MG/ML
5 INJECTION, SOLUTION INTRAVENOUS EVERY 4 HOURS PRN
Status: DISCONTINUED | OUTPATIENT
Start: 2022-08-08 | End: 2022-08-10 | Stop reason: HOSPADM

## 2022-08-08 RX ORDER — NIFEDIPINE 30 MG/1
90 TABLET, EXTENDED RELEASE ORAL DAILY
Status: DISCONTINUED | OUTPATIENT
Start: 2022-08-08 | End: 2022-08-10 | Stop reason: HOSPADM

## 2022-08-08 RX ORDER — HYDRALAZINE HYDROCHLORIDE 25 MG/1
50 TABLET, FILM COATED ORAL EVERY 6 HOURS SCHEDULED
Status: DISCONTINUED | OUTPATIENT
Start: 2022-08-08 | End: 2022-08-09

## 2022-08-08 RX ORDER — OXYCODONE HYDROCHLORIDE AND ACETAMINOPHEN 5; 325 MG/1; MG/1
1 TABLET ORAL EVERY 8 HOURS PRN
Status: DISCONTINUED | OUTPATIENT
Start: 2022-08-08 | End: 2022-08-08

## 2022-08-08 RX ORDER — DIAZEPAM 5 MG/1
5 TABLET ORAL EVERY 6 HOURS PRN
Status: DISCONTINUED | OUTPATIENT
Start: 2022-08-08 | End: 2022-08-10 | Stop reason: HOSPADM

## 2022-08-08 RX ORDER — HYDRALAZINE HYDROCHLORIDE 20 MG/ML
5 INJECTION INTRAMUSCULAR; INTRAVENOUS EVERY 4 HOURS PRN
Status: DISCONTINUED | OUTPATIENT
Start: 2022-08-08 | End: 2022-08-10 | Stop reason: HOSPADM

## 2022-08-08 RX ORDER — HYDROCODONE BITARTRATE AND ACETAMINOPHEN 7.5; 325 MG/1; MG/1
1 TABLET ORAL EVERY 6 HOURS PRN
Status: DISCONTINUED | OUTPATIENT
Start: 2022-08-08 | End: 2022-08-10 | Stop reason: HOSPADM

## 2022-08-08 RX ORDER — NIFEDIPINE 30 MG/1
30 TABLET, EXTENDED RELEASE ORAL DAILY
Status: DISCONTINUED | OUTPATIENT
Start: 2022-08-08 | End: 2022-08-08

## 2022-08-08 RX ORDER — CARVEDILOL 6.25 MG/1
3.12 TABLET ORAL 2 TIMES DAILY WITH MEALS
Status: DISCONTINUED | OUTPATIENT
Start: 2022-08-08 | End: 2022-08-10 | Stop reason: HOSPADM

## 2022-08-08 RX ORDER — CYCLOBENZAPRINE HCL 10 MG
5 TABLET ORAL 3 TIMES DAILY PRN
Status: DISCONTINUED | OUTPATIENT
Start: 2022-08-08 | End: 2022-08-10 | Stop reason: HOSPADM

## 2022-08-08 RX ADMIN — HYDROCODONE BITARTRATE AND ACETAMINOPHEN 1 TABLET: 7.5; 325 TABLET ORAL at 15:26

## 2022-08-08 RX ADMIN — PERFLUTREN 1.5 ML: 6.52 INJECTION, SUSPENSION INTRAVENOUS at 08:09

## 2022-08-08 RX ADMIN — SODIUM BICARBONATE 650 MG: 650 TABLET ORAL at 21:37

## 2022-08-08 RX ADMIN — HYDRALAZINE HYDROCHLORIDE 50 MG: 25 TABLET, FILM COATED ORAL at 17:37

## 2022-08-08 RX ADMIN — Medication 10 MG: at 10:33

## 2022-08-08 RX ADMIN — ACETAMINOPHEN 325MG 650 MG: 325 TABLET ORAL at 09:06

## 2022-08-08 RX ADMIN — HYDROCODONE BITARTRATE AND ACETAMINOPHEN 1 TABLET: 5; 325 TABLET ORAL at 11:28

## 2022-08-08 RX ADMIN — SODIUM CHLORIDE, PRESERVATIVE FREE 10 ML: 5 INJECTION INTRAVENOUS at 00:21

## 2022-08-08 RX ADMIN — NIFEDIPINE 90 MG: 30 TABLET, EXTENDED RELEASE ORAL at 14:11

## 2022-08-08 RX ADMIN — Medication 10 MG: at 00:14

## 2022-08-08 RX ADMIN — Medication 5 MG: at 21:37

## 2022-08-08 RX ADMIN — CARVEDILOL 3.12 MG: 6.25 TABLET, FILM COATED ORAL at 17:37

## 2022-08-08 RX ADMIN — HYDRALAZINE HYDROCHLORIDE 50 MG: 25 TABLET, FILM COATED ORAL at 11:07

## 2022-08-08 RX ADMIN — SODIUM CHLORIDE, PRESERVATIVE FREE 10 ML: 5 INJECTION INTRAVENOUS at 10:33

## 2022-08-08 RX ADMIN — Medication 5 MG: at 00:13

## 2022-08-08 RX ADMIN — DIAZEPAM 5 MG: 5 TABLET ORAL at 21:41

## 2022-08-08 RX ADMIN — ACETAMINOPHEN 325MG 650 MG: 325 TABLET ORAL at 00:27

## 2022-08-08 RX ADMIN — SODIUM BICARBONATE 650 MG: 650 TABLET ORAL at 17:36

## 2022-08-08 RX ADMIN — PROMETHAZINE HYDROCHLORIDE 6.25 MG: 25 INJECTION INTRAMUSCULAR; INTRAVENOUS at 14:11

## 2022-08-08 RX ADMIN — LOSARTAN POTASSIUM 100 MG: 50 TABLET, FILM COATED ORAL at 09:07

## 2022-08-08 RX ADMIN — SODIUM BICARBONATE 650 MG: 650 TABLET ORAL at 11:07

## 2022-08-08 RX ADMIN — CYCLOBENZAPRINE 5 MG: 10 TABLET, FILM COATED ORAL at 15:40

## 2022-08-08 RX ADMIN — HYDRALAZINE HYDROCHLORIDE 10 MG: 20 INJECTION INTRAMUSCULAR; INTRAVENOUS at 09:06

## 2022-08-08 RX ADMIN — TRAZODONE HYDROCHLORIDE 100 MG: 50 TABLET ORAL at 00:14

## 2022-08-08 RX ADMIN — BUPROPION HYDROCHLORIDE 300 MG: 150 TABLET, EXTENDED RELEASE ORAL at 09:07

## 2022-08-08 RX ADMIN — SODIUM CHLORIDE, PRESERVATIVE FREE 10 ML: 5 INJECTION INTRAVENOUS at 09:08

## 2022-08-08 RX ADMIN — Medication 324 MG: at 09:07

## 2022-08-08 RX ADMIN — ONDANSETRON HYDROCHLORIDE 4 MG: 2 SOLUTION INTRAMUSCULAR; INTRAVENOUS at 09:53

## 2022-08-08 RX ADMIN — Medication 2000 UNITS: at 09:07

## 2022-08-08 RX ADMIN — HYDROCODONE BITARTRATE AND ACETAMINOPHEN 1 TABLET: 7.5; 325 TABLET ORAL at 21:42

## 2022-08-08 RX ADMIN — FAMOTIDINE 20 MG: 20 TABLET ORAL at 09:07

## 2022-08-08 ASSESSMENT — PAIN SCALES - GENERAL
PAINLEVEL_OUTOF10: 6
PAINLEVEL_OUTOF10: 7
PAINLEVEL_OUTOF10: 6
PAINLEVEL_OUTOF10: 0
PAINLEVEL_OUTOF10: 3
PAINLEVEL_OUTOF10: 2
PAINLEVEL_OUTOF10: 7
PAINLEVEL_OUTOF10: 3
PAINLEVEL_OUTOF10: 0
PAINLEVEL_OUTOF10: 0
PAINLEVEL_OUTOF10: 8
PAINLEVEL_OUTOF10: 5

## 2022-08-08 ASSESSMENT — PAIN SCALES - WONG BAKER
WONGBAKER_NUMERICALRESPONSE: 0
WONGBAKER_NUMERICALRESPONSE: 0
WONGBAKER_NUMERICALRESPONSE: 2
WONGBAKER_NUMERICALRESPONSE: 0

## 2022-08-08 ASSESSMENT — PAIN DESCRIPTION - DESCRIPTORS
DESCRIPTORS: ACHING;POUNDING;SHOOTING
DESCRIPTORS: ACHING
DESCRIPTORS: ACHING
DESCRIPTORS: ACHING;DULL
DESCRIPTORS: ACHING

## 2022-08-08 ASSESSMENT — PAIN DESCRIPTION - LOCATION
LOCATION: BACK;HEAD
LOCATION: HEAD
LOCATION: NECK

## 2022-08-08 ASSESSMENT — PAIN - FUNCTIONAL ASSESSMENT
PAIN_FUNCTIONAL_ASSESSMENT: ACTIVITIES ARE NOT PREVENTED
PAIN_FUNCTIONAL_ASSESSMENT: PREVENTS OR INTERFERES SOME ACTIVE ACTIVITIES AND ADLS

## 2022-08-08 ASSESSMENT — PAIN DESCRIPTION - ORIENTATION
ORIENTATION: MID
ORIENTATION: MID
ORIENTATION: ANTERIOR

## 2022-08-08 NOTE — PROGRESS NOTES
4 Eyes Skin Assessment    Vivien Malone is being assessed upon: Admission    I agree that I, Lay Guillaume RN, along with Niesha Goyal RN (either 2 RN's or 1 LPN and 1 RN) have performed a thorough Head to Toe Skin Assessment on the patient. ALL assessment sites listed below have been assessed. Areas assessed by both nurses:     [x]   Head, Face, and Ears   [x]   Shoulders, Back, and Chest  [x]   Arms, Elbows, and Hands   [x]   Coccyx, Sacrum, and Ischium  [x]   Legs, Feet, and Heels    Does the Patient have Skin Breakdown?  No    Ellis Prevention initiated: Yes  Wound Care Orders initiated: No    [facility] nurse consulted for Pressure Injury (Stage 3,4, Unstageable, DTI, NWPT, and Complex wounds) and New or Established Ostomies: No        Primary Nurse eSignature: Lay Guillaume RN on 8/8/2022 at 2:09 AM      Co-Signer eSignature: Electronically signed by Niesha Goyal RN on 8/8/22 at 2:45 AM CDT

## 2022-08-08 NOTE — CONSULTS
Nephrology ([facility]) Consult Note      Patient:  Teresita Garcia  YOB: 1990  Date of Service: 8/8/2022  MRN: [id number]   Acct: [de-identified]   Primary Care Physician: RAMONA Zamora - TAY  Advance Directive: Full Code  Admit Date: 8/7/2022       Hospital Day: 0  Referring Provider: Antwon Navarrete, *    Patient independently seen and examined, Chart, Consults, Notes, Operative notes, Labs, Cardiology, and Radiology studies reviewed as available. Chief complaint: Abnormal labs. Subjective:  Teresita Garcia is a 34 y.o. female for whom we were consulted for evaluation and treatment of acute kidney injury/chronic kidney disease. Patient follows RAMONA Martinez in the office for the treatment of chronic kidney disease stage IV. She has history of benign essential hypertension for the last several years. 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 Presented with feeling not well, feeling dizzy and was found to have severe systolic and diastolic hypertension. She is now admitted for management of severe hypertension. This morning her blood pressure is better.   Her serum creatinine is risen from his baseline    Allergies:  Allopurinol, Nsaids, Reglan [metoclopramide], Erythromycin, Penicillins, Percocet [oxycodone-acetaminophen], and Quinolones    Medicines:  Current Facility-Administered Medications   Medication Dose Route Frequency Provider Last Rate Last Admin    sodium bicarbonate tablet 650 mg  650 mg Oral 4x Daily Antwon Navarrete MD   650 mg at 08/08/22 1107    hydrALAZINE (APRESOLINE) tablet 50 mg  50 mg Oral 4 times per day Antwon Navarrete MD   50 mg at 08/08/22 1107    labetalol (NORMODYNE;TRANDATE) injection 5 mg  5 mg IntraVENous Q4H PRN Antwon Navarrete MD        hydrALAZINE (APRESOLINE) injection 5 mg  5 mg IntraVENous Q4H PRN Antwon Navarrete MD        HYDROcodone-acetaminophen (NORCO) 5-325 MG per tablet 1 tablet  1 tablet Oral Q4H PRN Markus Rdz MD   1 tablet at 08/08/22 1128    sodium chloride flush 0.9 % injection 5-40 mL  5-40 mL IntraVENous 2 times per day Tricia Clark MD   10 mL at 08/08/22 1033    sodium chloride flush 0.9 % injection 5-40 mL  5-40 mL IntraVENous PRN Tricia Clark MD        0.9 % sodium chloride infusion   IntraVENous PRN Tricia Clark MD        enoxaparin (LOVENOX) injection 40 mg  40 mg SubCUTAneous Daily Tricia Clark MD        ondansetron (ZOFRAN-ODT) disintegrating tablet 4 mg  4 mg Oral Q8H PRN Tricia Clark MD        Or    ondansetron [facility]) injection 4 mg  4 mg IntraVENous Q6H PRN Tricia Clark MD   4 mg at 08/08/22 0953    acetaminophen (TYLENOL) tablet 650 mg  650 mg Oral Q6H PRN Tricia Clark MD   650 mg at 08/08/22 7911    Or    acetaminophen (TYLENOL) suppository 650 mg  650 mg Rectal Q6H PRN Tricia Clark MD        potassium chloride (KLOR-CON M) extended release tablet 40 mEq  40 mEq Oral CHIARA Clark MD        Or    potassium bicarb-citric acid (EFFER-K) effervescent tablet 40 mEq  40 mEq Oral CHIARA Clark MD        Or    potassium chloride 10 mEq/100 mL IVPB (Peripheral Line)  10 mEq IntraVENous PRN Tricia Clark MD        magnesium sulfate 2000 mg in 50 mL IVPB premix  2,000 mg IntraVENous PRN Tricia Clark MD        polyethylene glycol (GLYCOLAX) packet 17 g  17 g Oral Daily PRANGEL Clark MD        melatonin disintegrating tablet 5 mg  5 mg Oral Nightly CHIARA Clark MD        calcium carbonate (TUMS) chewable tablet 500 mg  500 mg Oral TID CHIARA Clark MD        buPROPion (WELLBUTRIN XL) extended release tablet 300 mg  300 mg Oral QAM Tricia Clark MD   300 mg at 08/08/22 2229    Vitamin D (CHOLECALCIFEROL) tablet 2,000 Units  2,000 Units Oral Daily Tricia Clark MD   2,000 Units at 08/08/22 2270    ferrous gluconate 324 (37.5 Fe) MG tablet 324 mg  324 mg Oral Daily with breakfast Tricia Clark MD   324 mg at 08/08/22 0907    losartan (COZAAR) tablet 100 mg  100 mg Oral Daily Cosme Dalal MD   100 mg at 22 5115    melatonin disintegrating tablet 5 mg  5 mg Oral Nightly Cosme Dalal MD   5 mg at 22 0013    traZODone (DESYREL) tablet 100 mg  100 mg Oral Nightly Cosme Dalal MD   100 mg at 22 0014    famotidine (PEPCID) tablet 20 mg  20 mg Oral Daily Cosme Dalal MD   20 mg at 22 6345       Past Medical History:  Past Medical History:   Diagnosis Date    Anemia     Anxiety     Back pain     CAD (coronary artery disease)     CKD (chronic kidney disease) stage 4, GFR 15-29 ml/min ([facility] [address]     Depression     Fibromyalgia     Hypertension     Pulmonary stenosis        Past Surgical History:  Past Surgical History:   Procedure Laterality Date    ABDOMINOPLASTY  2019    CARDIAC SURGERY  2004    pt has cadaver valve     SECTION      , , 2017    CHOLECYSTECTOMY, LAPAROSCOPIC  2006    DENTAL SURGERY  2006    wisodm tooth extraction    EAR SURGERY      in [address] [place] [address] school - had tubes three times than had an ear drum hole that needed a skin graft done    HERNIA REPAIR      left sided incisonal hernia    HYSTERECTOMY (CERVIX STATUS UNKNOWN)      INCISION AND DRAINAGE OF WOUND  2014    after the caesarian section    LAPAROTOMY      2006, 2009, 2018       Family History  Family History   Problem Relation Age of Onset    No Known Problems Mother     No Known Problems Father         health informatgion nknwon other thna that he passed    Breast Cancer Half-Sister     No Known Problems Son     No Known Problems Daughter     No Known Problems Daughter        Social History  Social History     Socioeconomic History    Marital status:      Spouse name: Mr. Xenia Dickey    Number of children: 3    Years of education: Not on file    Highest education level: Not on file   Occupational History    Occupation: RN     Comment: works at [employer] Use    Smoking status: Never    Smokeless tobacco: Never   Vaping Use    Vaping Use: Never used   Substance and Sexual Activity    Alcohol use: Not Currently     Comment: in college    Drug use: Never    Sexual activity: Not on file     Comment: has 3 kids   Other Topics Concern    Not on file   Social History Narrative    CODE STATUS: Full Code    HEALTH CARE PROXY: her , Mr. Maia Huff, [phone]    AMBULATES: independently    DOMICILED: has stairs in her home to the basement which she rarely uses, lives with her  their three kids and a foster son, has a cat     Social Determinants of Health     Financial Resource Strain: Not on file   Food Insecurity: Not on file   Transportation Needs: Not on file   Physical Activity: Not on file   Stress: Not on file   Social Connections: Not on file   Intimate Partner Violence: Not on file   Housing Stability: Not on file         Review of Systems:  History obtained from chart review and the patient  General ROS: No fever or chills  Respiratory ROS: No cough, shortness of breath, wheezing  Cardiovascular ROS: No chest pain or palpitations  Gastrointestinal ROS: No abdominal pain or melena  Genito-Urinary ROS: No dysuria or hematuria  Musculoskeletal ROS: No joint pain or swelling   14 point ROS reviewed with the patient and negative except as noted above and in the HPI unless unable to obtain.     Objective:  Patient Vitals for the past 24 hrs:   BP Temp Temp src Pulse Resp SpO2 Height Weight   08/08/22 1158 -- -- -- -- 16 -- -- --   08/08/22 1129 (!) 151/62 -- -- 75 -- -- -- --   08/08/22 1128 -- -- -- -- 16 -- -- --   08/08/22 1109 (!) 179/69 -- -- 74 -- -- -- --   08/08/22 1042 (!) 198/77 -- -- 71 -- -- -- --   08/08/22 1034 (!) 213/94 -- -- -- -- -- -- --   08/08/22 0900 -- -- -- 70 -- -- -- --   08/07/22 2330 (!) 165/74 97.4 °F (36.3 °C) Oral 77 22 100 % -- --   08/07/22 2226 (!) 168/98 97.4 °F (36.3 °C) Oral 77 22 100 % 5' 10\" (1.778 m) 210 lb (95.3 kg)   08/07/22 2200 (!) 139/53 98 °F (36.7 °C) Oral 67 14 100 % -- --   08/07/22 2145 (!) 160/56 -- -- 69 15 100 % -- --   08/07/22 2100 (!) 203/76 -- -- 58 19 100 % -- --   08/07/22 2000 (!) 211/65 -- -- 65 15 100 % -- --   08/07/22 1900 (!) 196/86 -- -- 62 17 92 % -- --   08/07/22 1845 (!) 176/89 -- -- 69 21 100 % -- --   08/07/22 1610 138/61 97.9 °F (36.6 °C) -- (!) 101 16 100 % 5' 10\" (1.778 m) 210 lb (95.3 kg)       Intake/Output Summary (Last 24 hours) at 8/8/2022 1257  Last data filed at 8/8/2022 1148  Gross per 24 hour   Intake 10 ml   Output 800 ml   Net -790 ml     General: awake/alert   HEENT: Normocephalic atraumatic head  Chest:  clear to auscultation bilaterally  CVS: regular rate and rhythm  Abdominal: soft, nontender, normal bowel sounds  Extremities: no cyanosis or edema  Skin: warm and dry without rash      Labs:  BMP:   Recent Labs     08/07/22 1956 08/08/22  0513    139   K 4.2 4.8    107   CO2 23 21*   PHOS 3.9  --    BUN 36* 34*   CREATININE 2.3* 2.2*   CALCIUM 9.5 9.3     CBC:   Recent Labs     08/07/22 1956 08/08/22  0030   WBC 6.4 6.9   HGB 13.5 10.1*   HCT 41.8 30.0*   MCV 88.9 86.0    198     LIVER PROFILE:   Recent Labs     08/07/22 1956   AST 21   ALT 25   BILITOT <0.2   ALKPHOS 108*     PT/INR: No results for input(s): PROTIME, INR in the last 72 hours. APTT: No results for input(s): APTT in the last 72 hours. BNP:  No results for input(s): BNP in the last 72 hours. Ionized Calcium:No results for input(s): IONCA in the last 72 hours. Magnesium:  Recent Labs     08/07/22 1956   MG 2.3     Phosphorus:  Recent Labs     08/07/22 1956   PHOS 3.9     HgbA1C:   Recent Labs     08/07/22 1956   LABA1C 5.2     Hepatic:   Recent Labs     08/07/22 1956   ALKPHOS 108*   ALT 25   AST 21   PROT 7.5   BILITOT <0.2   LABALBU 4.5     Lactic Acid: No results for input(s): LACTA in the last 72 hours. Troponin: No results for input(s): CKTOTAL, CKMB, TROPONINT in the last 72 hours.   ABGs: No results for input(s): PH, PCO2, PO2, HCO3, O2SAT in the last 72 hours. CRP:  No results for input(s): CRP in the last 72 hours. Sed Rate:  No results for input(s): SEDRATE in the last 72 hours. Cultures:   No results for input(s): CULTURE in the last 72 hours. No results for input(s): BC, Bakari Grates in the last 72 hours. No results for input(s): CXSURG in the last 72 hours. Radiology reports as per the Radiologist  Radiology: No results found. Assessment   1. Stage IV chronic kidney disease baseline. 2.  Hypertensive nephrosclerosis. 3.  Poorly controlled systolic and diastolic hypertension. 4.  Anemia of chronic kidney disease. Plan:  1. Adjust blood pressure medications. 2.  Serum aldosterone and renin/ratio. 3.  Monitor renal function closely. Thank you for the consult, we appreciate the opportunity to provide care to your patients. Feel free to contact me if I can be of any further assistance.       Jannell Riedel, MD  08/08/22  12:57 PM

## 2022-08-08 NOTE — PROGRESS NOTES
chloride infusion   IntraVENous PRN Virgil Garvey MD        enoxaparin (LOVENOX) injection 40 mg  40 mg SubCUTAneous Daily Virgil Garvey MD        acetaminophen (TYLENOL) tablet 650 mg  650 mg Oral Q6H PRN Virgil Garvey MD   650 mg at 08/08/22 8083    Or    acetaminophen (TYLENOL) suppository 650 mg  650 mg Rectal Q6H PRN Virgil Garvey MD        potassium chloride (KLOR-CON M) extended release tablet 40 mEq  40 mEq Oral PRN Virgil Garvey MD        Or    potassium bicarb-citric acid (EFFER-K) effervescent tablet 40 mEq  40 mEq Oral PRN Virgil Garvey MD        Or    potassium chloride 10 mEq/100 mL IVPB (Peripheral Line)  10 mEq IntraVENous PRN Virgil Garvey MD        magnesium sulfate 2000 mg in 50 mL IVPB premix  2,000 mg IntraVENous PRN Virgil Garvey MD        polyethylene glycol (GLYCOLAX) packet 17 g  17 g Oral Daily PRN Virgil Garvey MD        melatonin disintegrating tablet 5 mg  5 mg Oral Nightly PRN Virgil Garvey MD        calcium carbonate (TUMS) chewable tablet 500 mg  500 mg Oral TID PRN Virgil Garvey MD        buPROPion (WELLBUTRIN XL) extended release tablet 300 mg  300 mg Oral QAM Virgil Garvey MD   300 mg at 08/08/22 3942    Vitamin D (CHOLECALCIFEROL) tablet 2,000 Units  2,000 Units Oral Daily Virgil Garvey MD   2,000 Units at 08/08/22 9870    ferrous gluconate 324 (37.5 Fe) MG tablet 324 mg  324 mg Oral Daily with breakfast Virgil Garvey MD   324 mg at 08/08/22 1712    losartan (COZAAR) tablet 100 mg  100 mg Oral Daily Virgil Garvey MD   100 mg at 08/08/22 4108    melatonin disintegrating tablet 5 mg  5 mg Oral Nightly Virgil Garvey MD   5 mg at 08/08/22 0013    traZODone (DESYREL) tablet 100 mg  100 mg Oral Nightly Virgil Garvey MD   100 mg at 08/08/22 0014     DVT Prophylaxis: Lovenox 40 mg sq daily    Continuous Infusions:   sodium chloride         Intake/Output Summary (Last 24 hours) at 8/8/2022 1835  Last data filed at 8/8/2022 1339  Gross per 24 hour   Intake 10 ml   Output 1601 ml   Net -1591 ml     CBC:   Recent Labs     08/07/22 1956 08/08/22  0030   WBC 6.4 6.9   HGB 13.5 10.1*    198     BMP:  Recent Labs     08/07/22 1956 08/08/22  0513    139   K 4.2 4.8    107   CO2 23 21*   BUN 36* 34*   CREATININE 2.3* 2.2*   GLUCOSE 103 99     ABGs: No results found for: PHART, PO2ART, UIP5MNY  INR: No results for input(s): INR in the last 72 hours. Objective:   Vitals: BP (!) 128/43   Pulse 88   Temp 97.7 °F (36.5 °C) (Oral)   Resp 20   Ht 5' 10\" (1.778 m)   Wt 210 lb (95.3 kg)   SpO2 99%   BMI 30.13 kg/m²   General appearance: alert, appears stated age and cooperative  Skin: Skin color, texture, turgor normal.   HEENT: Head: Normocephalic, no lesions, without obvious abnormality.   Neck: no adenopathy, no carotid bruit, no JVD, and supple, symmetrical, trachea midline  Lungs: clear to auscultation bilaterally  Heart: regular rate and rhythm, S1, S2 normal, no murmur, click, rub or gallop  Abdomen: soft, non-tender; bowel sounds normal; no masses,  no organomegaly  Extremities: extremities normal, atraumatic, no cyanosis or edema  Lymphatic: No significant lymph node enlargement papable  Neurologic: Mental status: Alert, oriented, thought content appropriate        Assessment & Plan:    Hypertension urgency with headache - cardiology and nephology consulted to manage HTN- echo pending   Headache- norco prn  Nausea- phenergan prn   Neck muscle spasm- valium prn, flexeril prn   Pulmonic stenosis and reoperation at age 12 with a cadaver valve  S/P Septal defect repair a  HL- adjust meds  CKD stage 4    Patient Active Problem List:     Hypertension     CKD (chronic kidney disease) stage 4, GFR 15-29 ml/min ([facility])     Fibromyalgia     Anemia     Depression     Anxiety        See orders   Disposition: home when better and cleared by the consultants     Markus Rdz MD

## 2022-08-08 NOTE — PROGRESS NOTES
Kaya Salas arrived to room # 12   Presented with: HTN  Mental Status: Patient is oriented, alert, coherent, logical, thought processes intact, and able to concentrate and follow conversation. Vitals:    08/07/22 2330   BP: (!) 165/74   Pulse: 77   Resp: 22   Temp: 97.4 °F (36.3 °C)   SpO2: 100%     Patient safety contract and falls prevention contract reviewed with patient Yes. Oriented Patient to room. Call light within reach. Yes.   Needs, issues or concerns expressed at this time: no.      Electronically signed by Mel Dailey RN on 8/8/2022 at 2:07 AM

## 2022-08-08 NOTE — PROGRESS NOTES
Bilateral Carotid Duplex performed. RT ICA: 50-69%            LT ICA: 50-69%     Bilateral Vertebral A. Antegrade flow at this time. This is a preliminary report. Final report pending.

## 2022-08-08 NOTE — CONSULTS
[facility] ([facility]) Cardiology   Consult Note       Requesting MD:  Rissa Doshi, *   Admit Status:         Patient:    Tory Prado  [id number]  1990         Chief Complaint: Accelerated hypertension  HPI: Patient is a 34 y.o. female has been treated for hypertension for a long period of time. Last several days patient has been having problem with headache and blood pressure over 200 on and off. She took some clonidine as needed and doubled the Avapro to 150 mg. Her blood pressure 1 going up. She was admitted for further management. Patient has had stage IV renal insufficiency. 2 years ago patient was started on ARB with no worsening of the renal function. Renal function was abnormal before the ARB. Patient also has had congenital heart disease with pulmonic stenosis and some form of septal defect. When she was young she underwent pulmonic sheild implant. Couple stents was put into the RV outflow. At the age 12 patient has to have second open heart surgery with replacement of the pulmonic stenotic valve and removal of the stent using cadaver valve. At the same time there was closure of certain septal defect. 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 She denies ever having any chest pain, myocardial infarct or CVA.   She is a non-smoker and nondrinker    Review of Systems:  HENNT: normal  PULMONARY: normal   CVS:see history of present illness  ABD: denies any abdominal pain  PERIPHERL: normal  MSK: no swelling of the lower extremities  CNS: Denies any dizziness, syncopal episode or history of stroke  Renal: Stage IV renal insufficiency    Cardiac Specific Data:  Specialty Problems          Cardiology Problems    CAD (coronary artery disease)        Hypertension        * (Principal) Hypertensive urgency        Near syncope        Pulmonary stenosis           Past Medical History:  Past Medical History:   Diagnosis Date    Anemia Anxiety     Back pain     CAD (coronary artery disease)     CKD (chronic kidney disease) stage 4, GFR 15-29 ml/min ([facility])     Depression     Fibromyalgia     Hypertension     Pulmonary stenosis         Past Surgical History:  Past Surgical History:   Procedure Laterality Date    ABDOMINOPLASTY  2019    CARDIAC SURGERY  2004    pt has cadaver valve     SECTION      , ,     CHOLECYSTECTOMY, LAPAROSCOPIC  2006    DENTAL SURGERY  2006    wisodm tooth extraction    EAR SURGERY      in [address] [place] [address] school - had tubes three times than had an ear drum hole that needed a skin graft done    HERNIA REPAIR      left sided incisonal hernia    HYSTERECTOMY (CERVIX STATUS UNKNOWN)      INCISION AND DRAINAGE OF WOUND      after the caesarian section    LAPAROTOMY      , ,        Past Family History:  Family History   Problem Relation Age of Onset    No Known Problems Mother     No Known Problems Father         health informatgion nknwon other thna that he passed    Breast Cancer Half-Sister     No Known Problems Son     No Known Problems Daughter     No Known Problems Daughter        Past Social History:  Social History     Socioeconomic History    Marital status:      Spouse name: Mr. Shanelle Ho    Number of children: 3    Years of education: Not on file    Highest education level: Not on file   Occupational History    Occupation: RN     Comment: works at [employer] Use    Smoking status: Never    Smokeless tobacco: Never   Vaping Use    Vaping Use: Never used   Substance and Sexual Activity    Alcohol use: Not Currently     Comment: in college    Drug use: Never    Sexual activity: Not on file     Comment: has 3 kids   Other Topics Concern    Not on file   Social History Narrative    CODE STATUS: Full Code    HEALTH CARE PROXY: her , Mr. Shanelle Ho, [phone]    AMBULATES: independently    DOMICILED: has stairs in her home to the basement which she rarely uses, lives with her  their three kids and a foster son, has a cat     Social Determinants of Health     Financial Resource Strain: Not on file   Food Insecurity: Not on file   Transportation Needs: Not on file   Physical Activity: Not on file   Stress: Not on file   Social Connections: Not on file   Intimate Partner Violence: Not on file   Housing Stability: Not on file       Allergies: Allergies   Allergen Reactions    Allopurinol Nausea Only     She got Acute Glomerular Nephritis    Nsaids Other (See Comments)     Ulcers so no nsaids - states she has had 3 perforated stomach ulcers    Reglan [Metoclopramide] Other (See Comments)     Tardive Dyskinesia    Erythromycin Hives, Itching and Rash     THE SEVERITY IS ERRONEOUS - it as created by IT - she states that \"I turned purple, but they said it was a rash not rouble breathing\" but then she is not certain - she is going to get information from [facility] /// the EMR will no longer allows doctors to make this more accurate by deleting questionable autopopulated material    Penicillins Hives, Itching and Rash     THE SEVERITY IS ERRONEOUS - it as created by IT - she states that \"I turned purple, but they said it was a rash not rouble breathing\" but then she is not certain - she is going to get information from [facility] in [facility] /// the EMR will no longer allows doctors to make this more accurate by deleting questionable autopopulated material    Percocet [Oxycodone-Acetaminophen] Nausea And Vomiting    Quinolones Itching and Rash       Prior to Admission medications    Medication Sig Start Date End Date Taking?  Authorizing Provider   traZODone (DESYREL) 100 MG tablet Take 100 mg by mouth nightly   Yes Historical Provider, MD   buPROPion (WELLBUTRIN XL) 300 MG extended release tablet Take 300 mg by mouth every morning   Yes Historical Provider, MD   Cholecalciferol (VITAMIN D3) 125 MCG (5000 UT) TABS Take by mouth   Yes Historical Provider, MD   ferrous gluconate (FERGON) 324 (38 Fe) MG tablet Take 324 mg by mouth daily (with breakfast)   Yes Historical Provider, MD   loratadine (CLARITIN) 10 MG capsule Take 10 mg by mouth in the morning. Yes Historical Provider, MD   famotidine (PEPCID) 40 MG tablet Take 40 mg by mouth in the morning. Yes Historical Provider, MD   melatonin 5 MG TABS tablet Take 5 mg by mouth in the morning.    Yes Historical Provider, MD   irbesartan (AVAPRO) 300 MG tablet Take 300 mg by mouth nightly   Yes Historical Provider, MD   cloNIDine (CATAPRES) 0.1 MG tablet Take 0.1 mg by mouth 6 times daily Can take up to 6 times daily as needed for high BP   Yes Historical Provider, MD        Current Facility-Administered Medications   Medication Dose Route Frequency Provider Last Rate Last Admin    sodium bicarbonate tablet 650 mg  650 mg Oral 4x Daily Valentino Coria, MD   650 mg at 08/08/22 1107    hydrALAZINE (APRESOLINE) tablet 50 mg  50 mg Oral 4 times per day Valentino Coria, MD   50 mg at 08/08/22 1107    labetalol (NORMODYNE;TRANDATE) injection 5 mg  5 mg IntraVENous Q4H PRN Valentino Coria, MD        hydrALAZINE (APRESOLINE) injection 5 mg  5 mg IntraVENous Q4H PRN Valentino Coria, MD        HYDROcodone-acetaminophen [facility]) 5-325 MG per tablet 1 tablet  1 tablet Oral Q4H PRN Valentino Coria, MD   1 tablet at 08/08/22 1128    NIFEdipine (PROCARDIA XL) extended release tablet 90 mg  90 mg Oral Daily Joshua Bell MD        carvedilol (COREG) tablet 3.125 mg  3.125 mg Oral BID  Joshua Bell MD        sodium chloride flush 0.9 % injection 5-40 mL  5-40 mL IntraVENous 2 times per day Mariella Ruiz MD   10 mL at 08/08/22 1033    sodium chloride flush 0.9 % injection 5-40 mL  5-40 mL IntraVENous PRN Mariella Ruiz MD        0.9 % sodium chloride infusion   IntraVENous PRN Mariella Ruiz MD        enoxaparin (LOVENOX) injection 40 mg  40 mg SubCUTAneous Daily Majo Arthur Crissy Pablo MD        ondansetron (ZOFRAN-ODT) disintegrating tablet 4 mg  4 mg Oral Q8H PRN Romelia Ramesh MD        Or    ondansetron [facility] injection 4 mg  4 mg IntraVENous Q6H PRN Romelia Ramesh MD   4 mg at 08/08/22 0953    acetaminophen (TYLENOL) tablet 650 mg  650 mg Oral Q6H PRN Romelia Ramesh MD   650 mg at 08/08/22 3343    Or    acetaminophen (TYLENOL) suppository 650 mg  650 mg Rectal Q6H PRN Romelia Ramesh MD        potassium chloride (KLOR-CON M) extended release tablet 40 mEq  40 mEq Oral PRN Romelia Ramesh MD        Or    potassium bicarb-citric acid (EFFER-K) effervescent tablet 40 mEq  40 mEq Oral PRN Romelia Ramesh MD        Or    potassium chloride 10 mEq/100 mL IVPB (Peripheral Line)  10 mEq IntraVENous PRN Romelia Ramesh MD        magnesium sulfate 2000 mg in 50 mL IVPB premix  2,000 mg IntraVENous PRN Romelia Ramesh MD        polyethylene glycol (GLYCOLAX) packet 17 g  17 g Oral Daily PRN Romelia Ramesh MD        melatonin disintegrating tablet 5 mg  5 mg Oral Nightly PRN Romelia Ramesh MD        calcium carbonate (TUMS) chewable tablet 500 mg  500 mg Oral TID PRN Romelia Ramesh MD        buPROPion (WELLBUTRIN XL) extended release tablet 300 mg  300 mg Oral QAM Romelia Ramesh MD   300 mg at 08/08/22 0643    Vitamin D (CHOLECALCIFEROL) tablet 2,000 Units  2,000 Units Oral Daily Romelia Ramesh MD   2,000 Units at 08/08/22 9293    ferrous gluconate 324 (37.5 Fe) MG tablet 324 mg  324 mg Oral Daily with breakfast Romelia Ramesh MD   324 mg at 08/08/22 5654    losartan (COZAAR) tablet 100 mg  100 mg Oral Daily Romelia Ramesh MD   100 mg at 08/08/22 1017    melatonin disintegrating tablet 5 mg  5 mg Oral Nightly Romelia Ramesh MD   5 mg at 08/08/22 0013    traZODone (DESYREL) tablet 100 mg  100 mg Oral Nightly Romelia Ramesh MD   100 mg at 08/08/22 0014    famotidine (PEPCID) tablet 20 mg  20 mg Oral Daily Romelia Ramesh MD   20 mg at 08/08/22 9010        Current Infused Meds:   sodium chloride Physical Exam:  Vitals:    08/08/22 1158   BP:    Pulse:    Resp: 16   Temp:    SpO2:        Intake/Output Summary (Last 24 hours) at 8/8/2022 1334  Last data filed at 8/8/2022 1148  Gross per 24 hour   Intake 10 ml   Output 800 ml   Net -790 ml     Estimated body mass index is 30.13 kg/m² as calculated from the following:    Height as of this encounter: 5' 10\" (1.778 m). Weight as of this encounter: 210 lb (95.3 kg). PHYSICAL EXAM:  HENNT: normal  PULMONARY: normal to inspection, palpation, percussion and ascultation  CVS:Normal neck vein, S1 and S2 decreased, 1/6 systolic ejection murmur best heard at the base of the heart with radiation to the left carotids, at there is a 2/6 early diastolic blow best heard at the left sternal border  ABD: liver and spleen not palpable, nontender, bowel sound normal  PERIPHERL: all pulses are normal with no bruit  MSK: no swelling of the lower extremities  CNS: Normal CN 2,3,4,6,5,7,9,10,11,and 12. Oriented to time, place and person    Labs:  Recent Labs     08/07/22 1956 08/08/22  0030   WBC 6.4 6.9   HGB 13.5 10.1*    198       Recent Labs     08/07/22 1956 08/08/22  0513    139   K 4.2 4.8    107   CO2 23 21*   BUN 36* 34*   CREATININE 2.3* 2.2*   CALCIUM 9.5 9.3   PHOS 3.9  --        CK, CKMB, Troponin:   Recent Labs     08/07/22 1956 08/08/22  0030 08/08/22  0513   TROPONINI <0.01 <0.01 <0.01       Last 3 BNP:  No results for input(s): PROBNP in the last 72 hours. No results found. Assessment and Plan:  Accelerated hypertension with headache  Pulmonic stenosis and reoperation at age 12 with a cadaver valve  Stage IV renal insufficiency  Some form of septal defect repair at the same time during the second open heart surgery at age 12    Plan: We we will review the echocardiogram.  At the same time beta-blocker will be initiated, Procardia with the increase in dosing.     I have spent 60 minutes reviewing the chart, taking history, examining the patient, discussion with the patient and the family concerning the finding, diagnoses and treatment plan. This dictation was performed using [address]. Mistake and misspelling may have been created without  realizing them. Please notify me for clarification.   Thank you    Teresa Welch MD   8/8/2022, 1:34 PM

## 2022-08-08 NOTE — ED PROVIDER NOTES
[facility] EMERGENCY DEPT  EMERGENCY DEPARTMENT ENCOUNTER      Pt Name: Sravanthi Starr  MRN: [id number]  [place] 1990  Date of evaluation: 8/7/2022  Provider: Sammy Masters MD    CHIEF COMPLAINT       Chief Complaint   Patient presents with    Hypertension     208/93 at home after clonidine x3. Pt was at [facility] Friday for same complaint and was given hydralazine IV          HISTORY OF PRESENT ILLNESS   (Location/Symptom, Timing/Onset,Context/Setting, Quality, Duration, Modifying Factors, Severity)  Note limiting factors. Sravanthi Starr is a 34 y.o. female who presents to the emergency department for evaluation of significantly elevated blood pressures recently. States she started having dizziness recently. Blood pressure had been starting to increase around June of this year and had her Avapro doubled around the beginning of July. Recently started having symptoms which she felt were orthostatic dizziness. Recently checked her blood pressure and it was over 496 systolic which is never happened to her before. Took some clonidine with mild transient improvement. Went to [place] ER last night and was given a dose of IV hydralazine and had improvement in blood pressure but then it worsened again today. Had pulmonic valve replaced 18 years ago. Has CKD for the last 4 years, followed by nephrology here. Followed by cardiology in [place]. Says been several years since her last echo. HPI    NursingNotes were reviewed. REVIEW OF SYSTEMS    (2-9 systems for level 4, 10 or more for level 5)     Review of Systems   Constitutional:  Negative for fatigue and fever. HENT:  Negative for congestion, rhinorrhea and voice change. Eyes:  Negative for pain and redness. Respiratory:  Negative for cough and shortness of breath. Cardiovascular:  Negative for chest pain. Gastrointestinal:  Negative for abdominal pain, diarrhea and vomiting. Endocrine: Negative. Genitourinary: Negative.     Musculoskeletal: Negative for arthralgias and gait problem. Skin:  Negative for rash and wound. Neurological:  Positive for dizziness. Negative for weakness and headaches. Hematological: Negative. Psychiatric/Behavioral: Negative. All other systems reviewed and are negative. A complete review of systems was performed and is negative except as noted above in the HPI. PAST MEDICAL HISTORY     Past Medical History:   Diagnosis Date    Anemia     Anxiety     Back pain     CAD (coronary artery disease)     CKD (chronic kidney disease) stage 4, GFR 15-29 ml/min ([facility])     Depression     Fibromyalgia     Hypertension     Pulmonary stenosis          SURGICAL HISTORY       Past Surgical History:   Procedure Laterality Date    ABDOMINOPLASTY      CARDIAC SURGERY      pt has cadaver valve     SECTION      CHOLECYSTECTOMY      DENTAL SURGERY      EAR SURGERY      HYSTERECTOMY (CERVIX STATUS UNKNOWN)      INCISION AND DRAINAGE OF WOUND      LAPAROSCOPY           CURRENT MEDICATIONS       Previous Medications    BUPROPION (WELLBUTRIN XL) 300 MG EXTENDED RELEASE TABLET    Take 300 mg by mouth every morning    CHOLECALCIFEROL (VITAMIN D3) 125 MCG (5000 UT) TABS    Take by mouth    CLONIDINE (CATAPRES) 0.1 MG TABLET    Take 0.1 mg by mouth 6 times daily Can take up to 6 times daily as needed for high BP    FAMOTIDINE (PEPCID) 40 MG TABLET    Take 40 mg by mouth in the morning. FERROUS GLUCONATE (FERGON) 324 (38 FE) MG TABLET    Take 324 mg by mouth daily (with breakfast)    IRBESARTAN (AVAPRO) 300 MG TABLET    Take 300 mg by mouth nightly    LORATADINE (CLARITIN) 10 MG CAPSULE    Take 10 mg by mouth in the morning. MELATONIN 5 MG TABS TABLET    Take 5 mg by mouth in the morning. TRAZODONE (DESYREL) 100 MG TABLET    Take 100 mg by mouth nightly       ALLERGIES     Allopurinol, Erythromycin, Nsaids, Penicillins, Quinolones, and Reglan [metoclopramide]    FAMILY HISTORY     History reviewed.  No pertinent family history. SOCIAL HISTORY       Social History     Socioeconomic History    Marital status:      Spouse name: None    Number of children: None    Years of education: None    Highest education level: None   Tobacco Use    Smoking status: Never    Smokeless tobacco: Never   Substance and Sexual Activity    Alcohol use: Never    Drug use: Never       SCREENINGS    [place] Coma Scale  Eye Opening: Spontaneous  Best Verbal Response: Oriented  Best Motor Response: Obeys commands  [place] Coma Scale Score: 15        PHYSICAL EXAM    (up to 7 for level 4, 8 or more for level 5)     ED Triage Vitals [08/07/22 1610]   BP Temp Temp src Heart Rate Resp SpO2 Height Weight   138/61 97.9 °F (36.6 °C) -- (!) 101 16 100 % 5' 10\" (1.778 m) 210 lb (95.3 kg)       Physical Exam  Vitals and nursing note reviewed. Constitutional:       General: She is not in acute distress. Appearance: She is well-developed. She is not diaphoretic. HENT:      Head: Normocephalic and atraumatic. Eyes:      General: No scleral icterus. Neck:      Vascular: No JVD. Cardiovascular:      Rate and Rhythm: Normal rate and regular rhythm. Pulses:           Radial pulses are 2+ on the right side and 2+ on the left side. Dorsalis pedis pulses are 2+ on the right side and 2+ on the left side. Heart sounds: Murmur heard. Systolic murmur is present with a grade of 3/6. No friction rub. No gallop. Pulmonary:      Effort: Pulmonary effort is normal. No accessory muscle usage or respiratory distress. Breath sounds: Normal breath sounds. No stridor. No decreased breath sounds, wheezing, rhonchi or rales. Chest:      Chest wall: No tenderness. Abdominal:      General: There is no distension. Palpations: Abdomen is soft. Tenderness: There is no abdominal tenderness. There is no guarding or rebound. Musculoskeletal:         General: No deformity. Normal range of motion. Right lower leg: No edema. Left lower leg: No edema. Skin:     General: Skin is warm and dry. Coloration: Skin is not pale. Findings: No erythema. Neurological:      Mental Status: She is alert and oriented to person, place, and time. GCS: GCS eye subscore is 4. GCS verbal subscore is 5. GCS motor subscore is 6. Cranial Nerves: No cranial nerve deficit. Motor: No abnormal muscle tone.       Coordination: Coordination normal.   Psychiatric:         Behavior: Behavior normal.         Judgment: Judgment normal.       DIAGNOSTIC RESULTS     EKG: All EKG's are interpreted by the Emergency Department Physician who either signs or Co-signs this chart in the absence of a cardiologist.        RADIOLOGY:   Non-plain film images such as CT, Ultrasound and MRI are read by the radiologist. Plainradiographic images are visualized and preliminarily interpreted by the emergency physician with the below findings:        Interpretation per the Radiologist below, if available at the time of this note:    VL DUP CAROTID BILATERAL    (Results Pending)         ED BEDSIDE ULTRASOUND:   Performed by ED Physician - none    LABS:  Labs Reviewed   CBC WITH AUTO DIFFERENTIAL - Abnormal; Notable for the following components:       Result Value    MCHC 32.3 (*)     All other components within normal limits   COMPREHENSIVE METABOLIC PANEL W/ REFLEX TO MG FOR LOW K - Abnormal; Notable for the following components:    BUN 36 (*)     Creatinine 2.3 (*)     GFR Non- 24 (*)     GFR African American 29 (*)     Alkaline Phosphatase 108 (*)     All other components within normal limits   MICROSCOPIC URINALYSIS - Abnormal; Notable for the following components:    Bacteria, UA NEGATIVE (*)     Crystals, UA NEG (*)     All other components within normal limits   COVID-19, RAPID   URINALYSIS WITH REFLEX TO CULTURE   PREGNANCY, URINE   TSH WITH REFLEX TO FT4   HEMOGLOBIN A1C   LIPID PANEL   MAGNESIUM   PHOSPHORUS       All other labs were within normal range or not returned as of this dictation. EMERGENCY DEPARTMENT COURSE and DIFFERENTIALDIAGNOSIS/MDM:   Vitals:    Vitals:    08/07/22 2000 08/07/22 2100 08/07/22 2145 08/07/22 2200   BP: (!) 211/65 (!) 203/76 (!) 160/56 (!) 139/53   Pulse: 65 58 69 67   Resp: 15 19 15 14   Temp:       SpO2: 100% 100% 100% 100%   Weight:       Height:           [facility]    ED Course as of 08/07/22 2205   Sun Aug 07, 2022   2201 EKG shows sinus rhythm with a rate of 65. Borderline T wave flattening laterally. No ST elevation or depression. Normal QRS and QTc intervals. [MINNIE]   1797 Renal function is at recent baseline. Patient is hypertensive with a wide pulse pressure. Has a systolic murmur consistent with pulmonic valve issues in the past.  Unclear whether this murmur has changed or worsened. Given description of episodes of dizziness that sound orthostatic in nature along with hypertension and elevated pulse pressure, concern for new or worsening valvular insufficiency. [MINNIE]   1762 Plan for admission to hospitalist service for further evaluation and management. [MINNIE]      ED Course User Index  [MINNIE] Shari Church MD     Based on the evaluation and work-up here patient is felt to require further monitoring, work-up, or treatment that is available in the emergency department. Case was discussed with Dr. Regina Jaramillo who agrees for observation or admission for further management. Treatment and stabilization as necessary were provided in the emergency department prior to transfer of care to the medicine service. CONSULTS:  None    PROCEDURES:  Unless otherwise notedbelow, none     Procedures    FINAL IMPRESSION     1. Hypertensive urgency    2. History of prosthetic pulmonic valve replacement    3. Stage 4 chronic kidney disease [facility])          DISPOSITION/PLAN   DISPOSITION Admitted 08/07/2022 09:44:19 PM      No notes of EC Admission Criteria type on file.     PATIENT REFERRED TO:  No follow-up provider specified.     DISCHARGE MEDICATIONS:  New Prescriptions    No medications on file          (Please note that portions of this note were completed with a voice recognition program.  Efforts were made to edit the dictations butoccasionally words are mis-transcribed.)    Alvin García MD (electronically signed)  Emergency Physician          Alvin García., MD  08/07/22 7095

## 2022-08-08 NOTE — H&P
[facility] History and Physical    Patient Information:  Patient: Ayleen José  MRN: [id number]   Acct: [de-identified]  YOB: 1990  Admit Date: 8/7/2022      Date of Service: 8/7/2022  Primary Care Physician: Mellie Snellen, APRN - NP  Advance Directive: No Order  Health Care Proxy: her , Mr. Mami Kendall, [phone]        SUBJECTIVE:    Chief Complaint   Patient presents with    Hypertension     208/93 at home after clonidine x3. 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 She has been feeling ill since Friday. She states that \"it started with dizziness and has kind of progressed from there. \" She states that she came in [facility] as she was monitoirng her blood pressure all weekend. She states that she used her PRN clonidine yesterday and it was effective, but the clonidine was not effective today. She was then directed to the ED here. Review of Systems:   Review of Systems   Constitutional:  Positive for fatigue. Negative for chills, diaphoresis and fever. Denied malaise   HENT:  Negative for sneezing. Respiratory:  Negative for cough and shortness of breath. Cardiovascular:  Negative for chest pain. Gastrointestinal:  Negative for nausea. Neurological:  Positive for weakness, light-headedness and headaches. Negative for syncope. Psychiatric/Behavioral:  Negative for confusion.       Past Medical History:   Diagnosis Date    Anemia     Anxiety     Back pain     CAD (coronary artery disease)     CKD (chronic kidney disease) stage 4, GFR 15-29 ml/min ([facility])     Depression     Fibromyalgia     Hypertension     Pulmonary stenosis      Past Psychiatric History:  As per above    Past Surgical History:   Procedure Laterality Date ABDOMINOPLASTY  2019    CARDIAC SURGERY      pt has cadaver valve     SECTION      , , 2017    CHOLECYSTECTOMY, LAPAROSCOPIC  2006    DENTAL SURGERY  2006    wisodm tooth extraction    EAR SURGERY      in [address] [place] [address] school - had tubes three times than had an ear drum hole that needed a skin graft done    HERNIA REPAIR      left sided incisonal hernia    HYSTERECTOMY (CERVIX STATUS UNKNOWN)      INCISION AND DRAINAGE OF WOUND  2014    after the caesarian section    LAPAROTOMY      , ,      Social History       Tobacco History       Smoking Status  Never      Smokeless Tobacco Use  Never              Alcohol History       Alcohol Use Status  Not Currently Comment  in college              Drug Use       Drug Use Status  Never              Sexual Activity       Sexually Active  Not Asked Comment  has 3 kids             CODE STATUS: Full Code  HEALTH CARE PROXY: her , Mr. Luis Cardenas, [phone]  AMBULATES: independently  DOMICILED: has stairs in her home to the basement which she rarely uses, lives with her  their three kids and a foster son, has a cat     Family History   Problem Relation Age of Onset    No Known Problems Mother     No Known Problems Father         health informatgion nknwon other thna that he passed    Breast Cancer Half-Sister     No Known Problems Son     No Known Problems Daughter     No Known Problems Daughter      Allergies   Allergen Reactions    Allopurinol Nausea Only     She got Acute Glomerular Nephritis    Nsaids Other (See Comments)     Ulcers so no nsaids - states she has had 3 perforated stomach ulcers    Reglan [Metoclopramide] Other (See Comments)     Tardive Dyskinesia    Erythromycin Hives, Itching and Rash     THE SEVERITY IS ERRONEOUS - it as created by IT - she states that \"I turned purple, but they said it was a rash not rouble breathing\" but then she is not certain - she is going to get information from [facility] in [facility] /// the EMR will no longer allows doctors to make this more accurate by deleting questionable autopopulated material    Penicillins Hives, Itching and Rash     THE SEVERITY IS ERRONEOUS - it as created by IT - she states that \"I turned purple, but they said it was a rash not rouble breathing\" but then she is not certain - she is going to get information from [facility] in [facility] /// the EMR will no longer allows doctors to make this more accurate by deleting questionable autopopulated material    Quinolones Itching and Rash     Home Medications:  Prior to Admission medications    Medication Sig Start Date End Date Taking? Authorizing Provider   traZODone (DESYREL) 100 MG tablet Take 100 mg by mouth nightly   Yes Historical Provider, MD   buPROPion (WELLBUTRIN XL) 300 MG extended release tablet Take 300 mg by mouth every morning   Yes Historical Provider, MD   Cholecalciferol (VITAMIN D3) 125 MCG (5000 UT) TABS Take by mouth   Yes Historical Provider, MD   ferrous gluconate (FERGON) 324 (38 Fe) MG tablet Take 324 mg by mouth daily (with breakfast)   Yes Historical Provider, MD   loratadine (CLARITIN) 10 MG capsule Take 10 mg by mouth in the morning. Yes Historical Provider, MD   famotidine (PEPCID) 40 MG tablet Take 40 mg by mouth in the morning. Yes Historical Provider, MD   melatonin 5 MG TABS tablet Take 5 mg by mouth in the morning.    Yes Historical Provider, MD   irbesartan (AVAPRO) 300 MG tablet Take 300 mg by mouth nightly   Yes Historical Provider, MD   cloNIDine (CATAPRES) 0.1 MG tablet Take 0.1 mg by mouth 6 times daily Can take up to 6 times daily as needed for high BP   Yes Historical Provider, MD         OBJECTIVE:    Vitals:    08/07/22 2200   BP: (!) 139/53   Pulse: 67   Resp: 14   Temp: 98 °F (36.7 °C)   SpO2: 100%   Breathing room air    BP (!) 139/53   Pulse 67   Temp 98 °F (36.7 °C) (Oral)   Resp 14   Ht 5' 10\" (1.778 m)   Wt 210 lb (95.3 kg)   SpO2 100%   BMI 30.13 kg/m²     No intake or output data in the 24 hours ending 08/07/22 2235    Physical Exam  Vitals reviewed. Constitutional:       General: She is not in acute distress. Appearance: Normal appearance. She is normal weight. She is not ill-appearing or toxic-appearing. HENT:      Head: Normocephalic and atraumatic. Nose: No congestion or rhinorrhea. Eyes:      General:         Right eye: No discharge. Left eye: No discharge. Neck:      Comments: Supple, trachea appears midline  Cardiovascular:      Rate and Rhythm: Normal rate and regular rhythm. Heart sounds: Murmur (loudest over the pulmonic area BUT with radiation to the carotids) heard. No friction rub. No gallop. Pulmonary:      Effort: Pulmonary effort is normal. No respiratory distress. Breath sounds: No stridor. No wheezing, rhonchi or rales. Chest:      Chest wall: No tenderness. Abdominal:      Palpations: Abdomen is soft. Tenderness: There is no abdominal tenderness. There is no guarding or rebound. Skin:     General: Skin is warm. Comments: nondiaphoretic   Neurological:      Mental Status: She is alert and oriented to person, place, and time. Psychiatric:         Mood and Affect: Mood normal.         Behavior: Behavior normal.        LABORATORY DATA:    CBC:   Recent Labs     08/07/22 1956   WBC 6.4   HGB 13.5   HCT 41.8        BMP:   Recent Labs     08/07/22 1956      K 4.2      CO2 23   BUN 36*   CREATININE 2.3*   CALCIUM 9.5   PHOS 3.9     Hepatic Profile:   Recent Labs     08/07/22 1956   AST 21   ALT 25   BILITOT <0.2   ALKPHOS 108*     Cardiac Enzymes: No results for input(s): Rhae Childes in the last 72 hours. Pro-BNP: No results for input(s): PROBNP in the last 72 hours.     A1C:   Recent Labs     08/07/22 1956   LABA1C 5.2     TSH: Invalid input(s): LABTSH    Urinalysis:   Lab Results   Component Value Date/Time    NITRU Negative 08/07/2022 07:40 PM    WBCUA 2 08/07/2022 07:40 PM    BACTERIA NEGATIVE 08/07/2022 07:40 PM    RBCUA 1 08/07/2022 07:40 PM    BLOODU Negative 08/07/2022 07:40 PM    SPECGRAV 1.013 08/07/2022 07:40 PM    GLUCOSEU Negative 08/07/2022 07:40 PM       IMAGING:  No results found.         ASESSMENTS & PLANS:    HTN Urgency:  Hx CKD 4:  Nephro consult - pt request & they are the HTN experts  Labetalol PRN - 2nd line  Hyralaine PRN - 1st line   Admit to cardiac vasquez as OBSERVATION status patient  Continue AvaPRO    Near Syncope:  Hx Pulmonic Valve Stenosis: with Cadaveric Repair at age 12 years  Tele  Cardio consult in AM  Trend TnI  Mag, Phos, TSH, Lipid Panel, HbA1c - will run as add ons to ED labs if able  CBC and BMP with Reflex for following AM  ASA as per protocol (324-325mg chewed STAT unless on 81ASA daily in which case 162mg chewed STAT if not yet given, THEN from following AM 81mg Daily.)  Statin as per protocol (High intensity Statin, if already on high intensity Stain of Simvasttain 80 continue it, if Lipitor 40+ then Lipitor 80 (if less than 40 just double so long as >=40), if Rosuvastatin 20mg+ then Rosuvastatin 40mg PO QHS (if less than 20 just double so long as >=20mg)  NG SL PRN CP  TTE in AM  Cartotid US VL Duplex     Chronic Medical Problems:  Continue current Regimen as indicated   [START ON 8/8/2022] buPROPion  300 mg Oral QAM    [START ON 8/8/2022] Vitamin D  2,000 Units Oral Daily    [START ON 8/8/2022] famotidine  40 mg Oral Daily    [START ON 8/8/2022] ferrous gluconate  324 mg Oral Daily with breakfast    [START ON 8/8/2022] losartan  100 mg Oral Daily    melatonin  5 mg Oral Nightly    traZODone  100 mg Oral Nightly     Supoportive and Prophylactic Txx:  DVT Prophylaxis: HEPARIN sq  GI (PUD) Ppx: not indicated  PT consult for evalutation and Txx as indicated: not indicated  No diet orders on file  hydrALAZINE, labetalol      are time of >60 minutes  Pt seen/examined and admitted to OBServation status. Inpatient status is used for patients with an expected LOS extending past two midnights due to medical therapy and or critical care needs, otherwise patients are placed to OBServation status. Signed:  Electronically signed by Da Fritz MD on 8/7/22 at 10:46 PM CDT.

## 2022-08-09 ENCOUNTER — APPOINTMENT (OUTPATIENT)
Dept: CT IMAGING | Age: 32
DRG: 305 | End: 2022-08-09
Payer: COMMERCIAL

## 2022-08-09 PROBLEM — G43.011 INTRACTABLE MIGRAINE WITHOUT AURA AND WITH STATUS MIGRAINOSUS: Status: ACTIVE | Noted: 2022-08-09

## 2022-08-09 PROBLEM — M54.2 NECK PAIN: Status: ACTIVE | Noted: 2022-08-09

## 2022-08-09 PROBLEM — R51.9 CHRONIC DAILY HEADACHE: Status: ACTIVE | Noted: 2022-08-09

## 2022-08-09 LAB
ALBUMIN SERPL-MCNC: 3.9 G/DL (ref 3.5–5.2)
ALP BLD-CCNC: 88 U/L (ref 35–104)
ALT SERPL-CCNC: 23 U/L (ref 5–33)
ANION GAP SERPL CALCULATED.3IONS-SCNC: 12 MMOL/L (ref 7–19)
AST SERPL-CCNC: 19 U/L (ref 5–32)
BILIRUB SERPL-MCNC: 0.3 MG/DL (ref 0.2–1.2)
BUN BLDV-MCNC: 36 MG/DL (ref 6–20)
CALCIUM SERPL-MCNC: 9.5 MG/DL (ref 8.6–10)
CHLORIDE BLD-SCNC: 103 MMOL/L (ref 98–111)
CO2: 23 MMOL/L (ref 22–29)
CREAT SERPL-MCNC: 2.6 MG/DL (ref 0.5–0.9)
GFR AFRICAN AMERICAN: 26
GFR NON-AFRICAN AMERICAN: 21
GLUCOSE BLD-MCNC: 94 MG/DL (ref 74–109)
HCT VFR BLD CALC: 36.9 % (ref 37–47)
HEMOGLOBIN: 11.8 G/DL (ref 12–16)
MCH RBC QN AUTO: 28.9 PG (ref 27–31)
MCHC RBC AUTO-ENTMCNC: 32 G/DL (ref 33–37)
MCV RBC AUTO: 90.2 FL (ref 81–99)
PDW BLD-RTO: 13.5 % (ref 11.5–14.5)
PLATELET # BLD: 248 K/UL (ref 130–400)
PMV BLD AUTO: 9.6 FL (ref 9.4–12.3)
POTASSIUM SERPL-SCNC: 4.7 MMOL/L (ref 3.5–5)
RBC # BLD: 4.09 M/UL (ref 4.2–5.4)
SODIUM BLD-SCNC: 138 MMOL/L (ref 136–145)
TOTAL PROTEIN: 6.2 G/DL (ref 6.6–8.7)
WBC # BLD: 7.3 K/UL (ref 4.8–10.8)

## 2022-08-09 PROCEDURE — 72125 CT NECK SPINE W/O DYE: CPT

## 2022-08-09 PROCEDURE — 99223 1ST HOSP IP/OBS HIGH 75: CPT | Performed by: PSYCHIATRY & NEUROLOGY

## 2022-08-09 PROCEDURE — G0378 HOSPITAL OBSERVATION PER HR: HCPCS

## 2022-08-09 PROCEDURE — 36415 COLL VENOUS BLD VENIPUNCTURE: CPT

## 2022-08-09 PROCEDURE — 99232 SBSQ HOSP IP/OBS MODERATE 35: CPT | Performed by: INTERNAL MEDICINE

## 2022-08-09 PROCEDURE — 6370000000 HC RX 637 (ALT 250 FOR IP): Performed by: PSYCHIATRY & NEUROLOGY

## 2022-08-09 PROCEDURE — 6370000000 HC RX 637 (ALT 250 FOR IP): Performed by: INTERNAL MEDICINE

## 2022-08-09 PROCEDURE — 6360000002 HC RX W HCPCS: Performed by: HOSPITALIST

## 2022-08-09 PROCEDURE — 6370000000 HC RX 637 (ALT 250 FOR IP): Performed by: HOSPITALIST

## 2022-08-09 PROCEDURE — 80053 COMPREHEN METABOLIC PANEL: CPT

## 2022-08-09 PROCEDURE — 70450 CT HEAD/BRAIN W/O DYE: CPT

## 2022-08-09 PROCEDURE — 2580000003 HC RX 258: Performed by: HOSPITALIST

## 2022-08-09 PROCEDURE — 96372 THER/PROPH/DIAG INJ SC/IM: CPT

## 2022-08-09 PROCEDURE — 85027 COMPLETE CBC AUTOMATED: CPT

## 2022-08-09 RX ORDER — BUTALBITAL, ACETAMINOPHEN AND CAFFEINE 50; 325; 40 MG/1; MG/1; MG/1
1 TABLET ORAL EVERY 4 HOURS PRN
Status: DISCONTINUED | OUTPATIENT
Start: 2022-08-09 | End: 2022-08-10 | Stop reason: HOSPADM

## 2022-08-09 RX ORDER — ATORVASTATIN CALCIUM 20 MG/1
20 TABLET, FILM COATED ORAL DAILY
Status: DISCONTINUED | OUTPATIENT
Start: 2022-08-10 | End: 2022-08-10 | Stop reason: HOSPADM

## 2022-08-09 RX ADMIN — BUTALBITAL, ACETAMINOPHEN, AND CAFFEINE 1 TABLET: 50; 325; 40 TABLET ORAL at 21:40

## 2022-08-09 RX ADMIN — SODIUM BICARBONATE 650 MG: 650 TABLET ORAL at 13:01

## 2022-08-09 RX ADMIN — Medication 5 MG: at 20:20

## 2022-08-09 RX ADMIN — SODIUM BICARBONATE 650 MG: 650 TABLET ORAL at 09:17

## 2022-08-09 RX ADMIN — SODIUM CHLORIDE, PRESERVATIVE FREE 10 ML: 5 INJECTION INTRAVENOUS at 20:20

## 2022-08-09 RX ADMIN — Medication 324 MG: at 09:25

## 2022-08-09 RX ADMIN — Medication 2000 UNITS: at 09:18

## 2022-08-09 RX ADMIN — LOSARTAN POTASSIUM 100 MG: 50 TABLET, FILM COATED ORAL at 09:18

## 2022-08-09 RX ADMIN — SODIUM CHLORIDE, PRESERVATIVE FREE 10 ML: 5 INJECTION INTRAVENOUS at 09:25

## 2022-08-09 RX ADMIN — HYDRALAZINE HYDROCHLORIDE 50 MG: 25 TABLET, FILM COATED ORAL at 01:34

## 2022-08-09 RX ADMIN — CARVEDILOL 3.12 MG: 6.25 TABLET, FILM COATED ORAL at 16:50

## 2022-08-09 RX ADMIN — HYDROCODONE BITARTRATE AND ACETAMINOPHEN 1 TABLET: 7.5; 325 TABLET ORAL at 05:03

## 2022-08-09 RX ADMIN — HYDRALAZINE HYDROCHLORIDE 50 MG: 25 TABLET, FILM COATED ORAL at 09:24

## 2022-08-09 RX ADMIN — SODIUM BICARBONATE 650 MG: 650 TABLET ORAL at 20:21

## 2022-08-09 RX ADMIN — BUTALBITAL, ACETAMINOPHEN, AND CAFFEINE 1 TABLET: 50; 325; 40 TABLET ORAL at 16:50

## 2022-08-09 RX ADMIN — DIAZEPAM 5 MG: 5 TABLET ORAL at 11:16

## 2022-08-09 RX ADMIN — SODIUM BICARBONATE 650 MG: 650 TABLET ORAL at 16:50

## 2022-08-09 RX ADMIN — TRAZODONE HYDROCHLORIDE 100 MG: 50 TABLET ORAL at 20:21

## 2022-08-09 RX ADMIN — CARVEDILOL 3.12 MG: 6.25 TABLET, FILM COATED ORAL at 09:24

## 2022-08-09 RX ADMIN — BUPROPION HYDROCHLORIDE 300 MG: 150 TABLET, EXTENDED RELEASE ORAL at 09:18

## 2022-08-09 RX ADMIN — DIAZEPAM 5 MG: 5 TABLET ORAL at 05:03

## 2022-08-09 RX ADMIN — ENOXAPARIN SODIUM 40 MG: 100 INJECTION SUBCUTANEOUS at 09:18

## 2022-08-09 RX ADMIN — HYDROCODONE BITARTRATE AND ACETAMINOPHEN 1 TABLET: 7.5; 325 TABLET ORAL at 11:16

## 2022-08-09 RX ADMIN — NIFEDIPINE 90 MG: 30 TABLET, EXTENDED RELEASE ORAL at 09:24

## 2022-08-09 ASSESSMENT — PAIN DESCRIPTION - LOCATION
LOCATION: HEAD
LOCATION: HEAD
LOCATION: HEAD;NECK
LOCATION: NECK;HEAD

## 2022-08-09 ASSESSMENT — PAIN DESCRIPTION - ORIENTATION
ORIENTATION: ANTERIOR
ORIENTATION: RIGHT;LEFT
ORIENTATION: RIGHT
ORIENTATION: RIGHT

## 2022-08-09 ASSESSMENT — PAIN SCALES - GENERAL
PAINLEVEL_OUTOF10: 5
PAINLEVEL_OUTOF10: 1
PAINLEVEL_OUTOF10: 5
PAINLEVEL_OUTOF10: 4

## 2022-08-09 ASSESSMENT — ENCOUNTER SYMPTOMS
PHOTOPHOBIA: 1
BACK PAIN: 0
COUGH: 0
NAUSEA: 1
SHORTNESS OF BREATH: 0
VOMITING: 0

## 2022-08-09 ASSESSMENT — PAIN DESCRIPTION - DESCRIPTORS
DESCRIPTORS: ACHING;CRUSHING
DESCRIPTORS: ACHING

## 2022-08-09 ASSESSMENT — PAIN SCALES - WONG BAKER: WONGBAKER_NUMERICALRESPONSE: 0

## 2022-08-09 ASSESSMENT — PAIN - FUNCTIONAL ASSESSMENT
PAIN_FUNCTIONAL_ASSESSMENT: PREVENTS OR INTERFERES SOME ACTIVE ACTIVITIES AND ADLS
PAIN_FUNCTIONAL_ASSESSMENT: PREVENTS OR INTERFERES SOME ACTIVE ACTIVITIES AND ADLS

## 2022-08-09 NOTE — PROGRESS NOTES
Cardiology Progress Note   Eric Fajardo MD      Patient:    Kaya Finger  [id number]  1990    Patient Active Problem List    Diagnosis Date Noted    Hypertension 08/07/2022     Priority: Medium    CKD (chronic kidney disease) stage 4, GFR 15-29 ml/min ([address] 08/07/2022     Priority: Medium    Pulmonary stenosis 08/07/2022     Priority: Medium    Fibromyalgia 08/07/2022     Priority: Medium    Back pain 08/07/2022     Priority: Medium    Anemia 08/07/2022     Priority: Medium    Depression 08/07/2022     Priority: Medium    CAD (coronary artery disease) 08/07/2022     Priority: Medium    Anxiety 08/07/2022     Priority: Medium    Hypertensive urgency 08/07/2022     Priority: Medium    Near syncope 08/07/2022     Priority: Medium       Admit Date:  8/7/2022    Admission Problem List: Present on Admission:   Pulmonary stenosis   Hypertension   Hypertensive urgency   Near syncope       Cardiac Specific Data:  Specialty Problems          Cardiology Problems    CAD (coronary artery disease)        Hypertension        * (Principal) Hypertensive urgency        Near syncope        Pulmonary stenosis           Subjective:  Patient blood pressure has come down to normal.  Echocardiogram showed no right to left shunting. Color-flow interrogation showed no left-to-right shunting. Right ventricular function is surprisingly normal with no dilatation. Pulmonic valve cannot be seen to clearly or the right ventricular outflow. Left ventricle and normal systolic function.   There were mild to moderate aortic insufficiency    Objective:   BP (!) 120/47   Pulse 80   Temp 98.8 °F (37.1 °C) (Temporal)   Resp 16   Ht 5' 10\" (1.778 m)   Wt 216 lb 2 oz (98 kg)   SpO2 98%   BMI 31.01 kg/m²       Intake/Output Summary (Last 24 hours) at 8/9/2022 1044  Last data filed at 8/9/2022 1003  Gross per 24 hour   Intake 960 ml   Output 1701 ml   Net -741 ml       Current Facility-Administered Medications   Medication Dose Route Frequency Provider Last Rate Last Admin    sodium bicarbonate tablet 650 mg  650 mg Oral 4x Daily Livier Melendez MD   650 mg at 08/09/22 3821    hydrALAZINE (APRESOLINE) tablet 50 mg  50 mg Oral 4 times per day Livier Melendez MD   50 mg at 08/09/22 8580    labetalol (NORMODYNE;TRANDATE) injection 5 mg  5 mg IntraVENous Q4H PRN Livier Melendez MD        hydrALAZINE (APRESOLINE) injection 5 mg  5 mg IntraVENous Q4H PRN Livier Melendez MD        NIFEdipine (PROCARDIA XL) extended release tablet 90 mg  90 mg Oral Daily Anna Leyva MD   90 mg at 08/09/22 0924    carvedilol (COREG) tablet 3.125 mg  3.125 mg Oral BID WC Anna Leyva MD   3.125 mg at 08/09/22 0924    promethazine (PHENERGAN) injection 6.25 mg  6.25 mg IntraMUSCular Q6H PRN Livier Melendez MD   6.25 mg at 08/08/22 1411    diazePAM (VALIUM) tablet 5 mg  5 mg Oral Q6H PRN Livier Melendez MD   5 mg at 08/09/22 0503    cyclobenzaprine (FLEXERIL) tablet 5 mg  5 mg Oral TID PRN Livier Melendez MD   5 mg at 08/08/22 1540    HYDROcodone-acetaminophen (Jannis Ales) 7.5-325 MG per tablet 1 tablet  1 tablet Oral Q6H PRN Livier Melendez MD   1 tablet at 08/09/22 0503    sodium chloride flush 0.9 % injection 5-40 mL  5-40 mL IntraVENous 2 times per day Ludmila Echeverria MD   10 mL at 08/09/22 0925    sodium chloride flush 0.9 % injection 5-40 mL  5-40 mL IntraVENous PRN Ludmila Echeverria MD        0.9 % sodium chloride infusion   IntraVENous PRN Ludmila Echeverria MD        enoxaparin (LOVENOX) injection 40 mg  40 mg SubCUTAneous Daily Ludmila Echeverria MD   40 mg at 08/09/22 7932    acetaminophen (TYLENOL) tablet 650 mg  650 mg Oral Q6H PRN Ludmila Echeverria MD   650 mg at 08/08/22 1467    Or    acetaminophen (TYLENOL) suppository 650 mg  650 mg Rectal Q6H PRN Ludmila Echeverria MD        potassium chloride (KLOR-CON M) extended release tablet 40 mEq  40 mEq Oral PRN Ludmila Echeverria MD        Or    potassium bicarb-citric acid (EFFER-K) effervescent tablet 40 mEq  40 mEq Oral PRN Beverlie Dubin, MD        Or    potassium chloride 10 mEq/100 mL IVPB (Peripheral Line)  10 mEq IntraVENous PRN Beverlie Dubin, MD        magnesium sulfate 2000 mg in 50 mL IVPB premix  2,000 mg IntraVENous PRN Beverlie Dubin, MD        polyethylene glycol (GLYCOLAX) packet 17 g  17 g Oral Daily PRN Beverlie Dubin, MD        melatonin disintegrating tablet 5 mg  5 mg Oral Nightly PRN Beverlie Dubin, MD        calcium carbonate (TUMS) chewable tablet 500 mg  500 mg Oral TID PRN Beverlie Dubin, MD        buPROPion (WELLBUTRIN XL) extended release tablet 300 mg  300 mg Oral QAM Beverlie Dubin, MD   300 mg at 08/09/22 1346    Vitamin D (CHOLECALCIFEROL) tablet 2,000 Units  2,000 Units Oral Daily Beverlie Dubin, MD   2,000 Units at 08/09/22 5763    ferrous gluconate 324 (37.5 Fe) MG tablet 324 mg  324 mg Oral Daily with breakfast Beverlie Dubin, MD   324 mg at 08/09/22 7210    losartan (COZAAR) tablet 100 mg  100 mg Oral Daily Beverlie Dubin, MD   100 mg at 08/09/22 5962    melatonin disintegrating tablet 5 mg  5 mg Oral Nightly Beverlie Dubin, MD   5 mg at 08/08/22 2137    traZODone (DESYREL) tablet 100 mg  100 mg Oral Nightly Beverlie Dubin, MD   100 mg at 08/08/22 0014         sodium bicarbonate  650 mg Oral 4x Daily    hydrALAZINE  50 mg Oral 4 times per day    NIFEdipine  90 mg Oral Daily    carvedilol  3.125 mg Oral BID WC    sodium chloride flush  5-40 mL IntraVENous 2 times per day    enoxaparin  40 mg SubCUTAneous Daily    buPROPion  300 mg Oral QAM    Vitamin D  2,000 Units Oral Daily    ferrous gluconate  324 mg Oral Daily with breakfast    losartan  100 mg Oral Daily    melatonin  5 mg Oral Nightly    traZODone  100 mg Oral Nightly         Physical Exam:  HENNT: normal  PULMONARY: normal to inspection, palpation, percussion and ascultation  CVS:Normal neck vein, S1 and S2 decreased, 1/6 systolic ejection murmur best heard at the base of the heart with radiation to the left carotids, at there is a 2/6 early diastolic blow best heard at the left sternal border  ABD: liver and spleen not palpable, nontender, bowel sound normal  PERIPHERL: all pulses are normal with no bruit  MSK: no swelling of the lower extremities  CNS: Normal CN 2,3,4,6,5,7,9,10,11,and 12. Oriented to time, place and person      RECENT LABS:    Lab Data:  CBC:   Recent Labs     08/07/22 1956 08/08/22 0030 08/09/22  0145   WBC 6.4 6.9 7.3   HGB 13.5 10.1* 11.8*   HCT 41.8 30.0* 36.9*   MCV 88.9 86.0 90.2    198 248     BMP:   Recent Labs     08/07/22 1956 08/08/22  0513 08/09/22  0145    139 138   K 4.2 4.8 4.7    107 103   CO2 23 21* 23   PHOS 3.9  --   --    BUN 36* 34* 36*   CREATININE 2.3* 2.2* 2.6*     LIVER PROFILE:   Recent Labs     08/07/22 1956 08/09/22  0145   AST 21 19   ALT 25 23   BILITOT <0.2 0.3   ALKPHOS 108* 88     PT/INR: No results for input(s): PROTIME, INR in the last 72 hours. APTT: No results for input(s): APTT in the last 72 hours. CK, CKMB, Troponin:   Recent Labs     08/07/22 1956 08/08/22 0030 08/08/22  0513   TROPONINI <0.01 <0.01 <0.01       Last 3 BNP:  No results for input(s): PROBNP in the last 72 hours. IMAGING:  No results found. Assessment and Plan:    Accelerated hypertension now resolved  Pulmonic stenosis and reoperation at age 12 with a cadaver valve, echocardiogram yesterday showed mild to moderately aortic insufficiency. Normal left ventricular and right ventricular systolic function. Pulmonic valve not well seen nor is the right ventricular outflow  Stage IV renal insufficiency  Some form of septal defect repair at the same time during the second open heart surgery at age 12  Plan: From cardiac standpoint patient can be discharged.   In the future patient has had more symptoms with shortness of breath, ROMEO is recommended to look at the pulmonic valve and the RV outflow    I have spent 30 minutes reviewing the chart, taking history, examining the patient, discussion with the patient and the family concerning the finding, diagnoses and treatment plan. This dictation was performed using [address] [place]. Mistake and misspelling may have been created without  realizing them. Please notify me for clarification.   Thank you    Rao Castro MD  8/9/2022 10:44 AM

## 2022-08-09 NOTE — CONSULTS
[facility] Neurology  [address] [place] [address] [address], [address]  [place], Ajit Renteria  Phone [phone]     Neurology Consultation     Date of Admission: 2022  6:32 PM  Date of Consultation: 22    Attending Provider: Jaskaran Farley, *  Consulting Provider: Maya Alicia M.D. Patient: Jim Araujo  :  1990  Age:  34 y.o. MRN:  [id number]    CHIEF COMPLAINT:  Headaches     History Source: History obtained from the patient. PCP: RAMONA Mike NP    HISTORY OF PRESENT ILLNESS:   Jim Araujo is a 34y.o. year old woman with a history of hypertension and stage 4 chronic kidney disease who was admitted  with hypertensive urgency and worsening kidney function. Her BP is better but she continues to have a severe headache. She has chronic daily headaches. Most of these are occipital dull and nagging and associated with neck pain. She will sometimes take Tylenol for them. She has occasional migraines with severe headaches associated with nausea, photophobia, and worsened by movement. The headaches tend to get worse with stress. She has not found other exacerbating or alleviating features. Her BP has been very high for the past several days. She has had a severe occipital headache, worse on the right and radiated to behind her eye. Today, her BP was back to normal but she continues to have a headache associated with nausea and photophobia. She has no history of head injuries, seizures, stroke, encephalitis.       PAST MEDICAL HISTORY:    Medical History:      Diagnosis Date    Anemia     Anxiety     Back pain     CAD (coronary artery disease)     CKD (chronic kidney disease) stage 4, GFR 15-29 ml/min (Formerly [facility])     Depression     Fibromyalgia     Hypertension     Pulmonary stenosis        Surgical History:      Procedure Laterality Date    ABDOMINOPLASTY  2019    CARDIAC SURGERY  2004    pt has cadaver valve     SECTION      , 2014, 2017    CHOLECYSTECTOMY, LAPAROSCOPIC   DENTAL SURGERY  2006    wisodm tooth extraction    [facility] than had an ear drum hole that needed a skin graft done    HERNIA REPAIR  2010    left sided incisonal hernia    HYSTERECTOMY (CERVIX STATUS UNKNOWN)  2020    INCISION AND DRAINAGE OF WOUND  2014    after the caesarian section    LAPAROTOMY      2006, 2009, 2018       Medications Prior to Admission:    Prior to Admission medications    Medication Sig Start Date End Date Taking? Authorizing Provider   traZODone (DESYREL) 100 MG tablet Take 100 mg by mouth nightly   Yes Historical Provider, MD   buPROPion (WELLBUTRIN XL) 300 MG extended release tablet Take 300 mg by mouth every morning   Yes Historical Provider, MD   Cholecalciferol (VITAMIN D3) 125 MCG (5000 UT) TABS Take by mouth   Yes Historical Provider, MD   ferrous gluconate (FERGON) 324 (38 Fe) MG tablet Take 324 mg by mouth daily (with breakfast)   Yes Historical Provider, MD   loratadine (CLARITIN) 10 MG capsule Take 10 mg by mouth in the morning. Yes Historical Provider, MD   famotidine (PEPCID) 40 MG tablet Take 40 mg by mouth in the morning. Yes Historical Provider, MD   melatonin 5 MG TABS tablet Take 5 mg by mouth in the morning.    Yes Historical Provider, MD   irbesartan (AVAPRO) 300 MG tablet Take 300 mg by mouth nightly   Yes Historical Provider, MD   cloNIDine (CATAPRES) 0.1 MG tablet Take 0.1 mg by mouth 6 times daily Can take up to 6 times daily as needed for high BP   Yes Historical Provider, MD       Current Medications:  Current Facility-Administered Medications: [START ON 8/10/2022] atorvastatin (LIPITOR) tablet 20 mg, 20 mg, Oral, Daily  sodium bicarbonate tablet 650 mg, 650 mg, Oral, 4x Daily  labetalol (NORMODYNE;TRANDATE) injection 5 mg, 5 mg, IntraVENous, Q4H PRN  hydrALAZINE (APRESOLINE) injection 5 mg, 5 mg, IntraVENous, Q4H PRN  NIFEdipine (PROCARDIA XL) extended release tablet 90 mg, 90 mg, Oral, Daily  carvedilol (COREG) tablet 3.125 mg, 3.125 mg, Oral, BID WC  promethazine (PHENERGAN) injection 6.25 mg, 6.25 mg, IntraMUSCular, Q6H PRN  diazePAM (VALIUM) tablet 5 mg, 5 mg, Oral, Q6H PRN  cyclobenzaprine (FLEXERIL) tablet 5 mg, 5 mg, Oral, TID PRN  HYDROcodone-acetaminophen (NORCO) 7.5-325 MG per tablet 1 tablet, 1 tablet, Oral, Q6H PRN  sodium chloride flush 0.9 % injection 5-40 mL, 5-40 mL, IntraVENous, 2 times per day  sodium chloride flush 0.9 % injection 5-40 mL, 5-40 mL, IntraVENous, PRN  0.9 % sodium chloride infusion, , IntraVENous, PRN  enoxaparin (LOVENOX) injection 40 mg, 40 mg, SubCUTAneous, Daily  acetaminophen (TYLENOL) tablet 650 mg, 650 mg, Oral, Q6H PRN **OR** acetaminophen (TYLENOL) suppository 650 mg, 650 mg, Rectal, Q6H PRN  potassium chloride (KLOR-CON M) extended release tablet 40 mEq, 40 mEq, Oral, PRN **OR** potassium bicarb-citric acid (EFFER-K) effervescent tablet 40 mEq, 40 mEq, Oral, PRN **OR** potassium chloride 10 mEq/100 mL IVPB (Peripheral Line), 10 mEq, IntraVENous, PRN  magnesium sulfate 2000 mg in 50 mL IVPB premix, 2,000 mg, IntraVENous, PRN  polyethylene glycol (GLYCOLAX) packet 17 g, 17 g, Oral, Daily PRN  melatonin disintegrating tablet 5 mg, 5 mg, Oral, Nightly PRN  calcium carbonate (TUMS) chewable tablet 500 mg, 500 mg, Oral, TID PRN  buPROPion (WELLBUTRIN XL) extended release tablet 300 mg, 300 mg, Oral, QAM  Vitamin D (CHOLECALCIFEROL) tablet 2,000 Units, 2,000 Units, Oral, Daily  ferrous gluconate 324 (37.5 Fe) MG tablet 324 mg, 324 mg, Oral, Daily with breakfast  losartan (COZAAR) tablet 100 mg, 100 mg, Oral, Daily  melatonin disintegrating tablet 5 mg, 5 mg, Oral, Nightly  traZODone (DESYREL) tablet 100 mg, 100 mg, Oral, Nightly    Allergies:  Allopurinol, Nsaids, Reglan [metoclopramide], Erythromycin, Penicillins, Percocet [oxycodone-acetaminophen], and Quinolones    Habits:  TOBACCO:   reports that she has never smoked.  She has never used smokeless tobacco.  ETOH:   reports that she does not currently use alcohol. DRUGS:    Social History     Substance and Sexual Activity   Drug Use Never       SOCIAL HISTORY:   Teresita Garcia is , lives in [place], [place], and is an RN at [employer]. FAMILY HISTORY:       Problem Relation Age of Onset    No Known Problems Mother     No Known Problems Father         health informatgion nknwon other thna that he passed    Breast Cancer Half-Sister     No Known Problems Son     No Known Problems Daughter     No Known Problems Daughter        REVIEW OF SYSTEMS:  Review of Systems   Constitutional:  Negative for chills and fever. HENT:  Negative for hearing loss and tinnitus. Eyes:  Positive for photophobia. Negative for visual disturbance. Respiratory:  Negative for cough and shortness of breath. Cardiovascular:  Negative for chest pain and palpitations. Gastrointestinal:  Positive for nausea. Negative for vomiting. Endocrine: Negative for polydipsia and polyuria. Genitourinary:  Negative for frequency and urgency. Musculoskeletal:  Positive for neck pain. Negative for arthralgias and back pain. Skin:  Negative for rash and wound. Allergic/Immunologic: Negative for environmental allergies and food allergies. Neurological:  Positive for dizziness, light-headedness and headaches. Negative for tremors, seizures, syncope, facial asymmetry, speech difficulty, weakness and numbness. Hematological:  Negative for adenopathy. Does not bruise/bleed easily. Psychiatric/Behavioral:  Negative for dysphoric mood. The patient is not nervous/anxious. PHYSICAL EXAMINATION:  Vitals: BP (!) 139/49   Pulse 75   Temp 98.2 °F (36.8 °C) (Oral)   Resp 17   Ht 5' 10\" (1.778 m)   Wt 216 lb 2 oz (98 kg)   SpO2 96%   BMI 31.01 kg/m²   General appearance: alert, appears stated age and cooperative  Skin: Skin color, texture, turgor normal. No rashes or lesions  HEENT: Head: Normal, normocephalic, atraumatic.   Neck:no adenopathy, no carotid bruit, no JVD, supple, symmetrical, trachea midline, and thyroid not enlarged, symmetric, no tenderness/mass/nodules  Lungs: clear to auscultation bilaterally  Heart: regular rate and rhythm, S1, S2 normal, no murmur, click, rub or gallop  Abdomen: soft, non-tender; bowel sounds normal; no masses,  no organomegaly  Extremities: extremities normal, atraumatic, no cyanosis or edema      NEUROLOGIC EXAMINATION:  Neurologic Exam     Mental Status   Oriented to person, place, and time. Speech: speech is normal   Level of consciousness: alert  Speech is fluent. Cranial Nerves     CN II   Visual fields full to confrontation. CN III, IV, VI   Pupils are equal, round, and reactive to light. Extraocular motions are normal.   Nystagmus: none     CN VII   Facial expression full, symmetric.      CN VIII   Hearing: intact    CN IX, X   Palate: symmetric    CN XI   Right sternocleidomastoid strength: normal  Left sternocleidomastoid strength: normal  Right trapezius strength: normal  Left trapezius strength: normal    CN XII   Tongue: not atrophic  Fasciculations: absent  Tongue deviation: none    Motor Exam   Muscle bulk: normal  Overall muscle tone: normal  Right arm pronator drift: absent  Left arm pronator drift: absent    Strength   Right neck flexion: 5/5  Left neck flexion: 5/5  Right neck extension: 5/5  Left neck extension: 5/5  Right deltoid: 5/5  Left deltoid: 5/5  Right biceps: 5/5  Left biceps: 5/5  Right triceps: 5/5  Left triceps: 5/5  Right wrist flexion: 5/5  Left wrist flexion: 5/5  Right wrist extension: 5/5  Left wrist extension: 5/5  Right interossei: 5/5  Left interossei: 5/5  Right iliopsoas: 5/5  Left iliopsoas: 5/5  Right quadriceps: 5/5  Left quadriceps: 5/5  Right glutei: 5/5  Left glutei: 5/5  Right anterior tibial: 5/5  Left anterior tibial: 5/5  Right posterior tibial: 5/5  Left posterior tibial: 5/5  Right peroneal: 5/5  Left peroneal: 5/5  Right gastroc: 5/5  Left gastroc: 5/5    Sensory Exam Light touch normal.   Vibration normal.   Pinprick normal.     Gait, Coordination, and Reflexes     Gait  Gait: normal    Coordination   Finger to nose coordination: normal  Heel to shin coordination: normal    Tremor   Resting tremor: absent  Intention tremor: absent  Action tremor: absent    Reflexes   Right brachioradialis: 2+  Left brachioradialis: 2+  Right biceps: 2+  Left biceps: 2+  Right triceps: 2+  Left triceps: 2+  Right patellar: 2+  Left patellar: 2+  Right achilles: 2+  Left achilles: 2+  Right plantar: normal  Left plantar: normal  Right Alberto: absent  Left Alberto: absent  Right ankle clonus: absent  Left ankle clonus: absent  Rapid alternating movements were normal.     ADDITIONAL REVIEW:  CBC:    Recent Labs     08/07/22 1956 08/08/22  0030 08/09/22  0145   WBC 6.4 6.9 7.3   HGB 13.5 10.1* 11.8*    198 248     BMP:     Recent Labs     08/07/22 1956 08/08/22  0513 08/09/22  0145    139 138   K 4.2 4.8 4.7    107 103   CO2 23 21* 23   BUN 36* 34* 36*   CREATININE 2.3* 2.2* 2.6*   GLUCOSE 103 99 94     Hepatic:  Recent Labs     08/07/22 1956 08/09/22  0145   AST 21 19   ALT 25 23   BILITOT <0.2 0.3   ALKPHOS 108* 88     Troponin T:    Recent Labs     08/07/22 1956 08/08/22  0030 08/08/22  0513   TROPONINI <0.01 <0.01 <0.01     Lipids:    Recent Labs     08/07/22 1956   CHOL 246*   HDL 44*       IMPRESSION:  1. Headache and neck pain. She has chronic daily headaches worsened with the hypertensive urgency. Need to exclude an intracranial hemorrhage. She has been getting some IV morphine and Valium that helps for a short time. She is not a candidate for a triptan. She has PUD with a history of rupture so NSAID and steroids are contraindicated. She has had side effects to Reglan. Typically headaches associated with hypertension improve a day or two after the BP improvement. 2. Hypertesion  3. Acute on chronic kidney disease  4.  Pulmonary valve replacement, pulmonary hypertension    PLAN:  1. CT head and C spine  2. Trino Young M.D. I spent 70 total minutes in face to face interaction with patient and coordination of care.    I spent > 50% of the total time discussing the diagnoses, evaluation, test results, treatment options, plans; counseling and advising with the patient and/or family and/or caregiver as well as with the care team.    Electronically signed by Terrell Corea M.D.

## 2022-08-09 NOTE — PROGRESS NOTES
HOSPITAL MEDICINE  - PROGRESS NOTE    Admit Date: 8/7/2022         CC: uncontrolled HTN, headache    Subjective: moderate headache, neck spasm with pain, no abd pain, no D/C, no chest pain, no palpitations, no dyspnea, no cough    Objective: mild to moderate distress    Diet: ADULT DIET;  Regular; Low Fat/Low Chol/High Fiber/LISSA  Pain is: moderate  Nausea: none   Bowel Movement/Flatus yes    Data:   Scheduled Meds: Reviewed  Current Facility-Administered Medications   Medication Dose Route Frequency Provider Last Rate Last Admin    sodium bicarbonate tablet 650 mg  650 mg Oral 4x Daily Allison Bond MD   650 mg at 08/09/22 1301    labetalol (NORMODYNE;TRANDATE) injection 5 mg  5 mg IntraVENous Q4H PRN Allison Bond MD        hydrALAZINE (APRESOLINE) injection 5 mg  5 mg IntraVENous Q4H PRN Allison Bond MD        NIFEdipine (PROCARDIA XL) extended release tablet 90 mg  90 mg Oral Daily Terrell Aldridge MD   90 mg at 08/09/22 0924    carvedilol (COREG) tablet 3.125 mg  3.125 mg Oral BID WC Terrell Aldridge MD   3.125 mg at 08/09/22 0924    promethazine (PHENERGAN) injection 6.25 mg  6.25 mg IntraMUSCular Q6H PRN Allison Bond MD   6.25 mg at 08/08/22 1411    diazePAM (VALIUM) tablet 5 mg  5 mg Oral Q6H PRN Allison Bond MD   5 mg at 08/09/22 1116    cyclobenzaprine (FLEXERIL) tablet 5 mg  5 mg Oral TID PRN Allison Bond MD   5 mg at 08/08/22 1540    HYDROcodone-acetaminophen ([place]) 7.5-325 MG per tablet 1 tablet  1 tablet Oral Q6H PRN Allison Bond MD   1 tablet at 08/09/22 1116    sodium chloride flush 0.9 % injection 5-40 mL  5-40 mL IntraVENous 2 times per day Vadim Rene MD   10 mL at 08/09/22 0925    sodium chloride flush 0.9 % injection 5-40 mL  5-40 mL IntraVENous PRN Vadim Rene MD        0.9 % sodium chloride infusion   IntraVENous PRN Vadim Rene MD        enoxaparin (LOVENOX) injection 40 mg  40 mg SubCUTAneous Daily Lilo Bronson MD   40 mg at 08/09/22 1778    acetaminophen (TYLENOL) tablet 650 mg  650 mg Oral Q6H PRN Lilo Bronson MD   650 mg at 08/08/22 0833    Or    acetaminophen (TYLENOL) suppository 650 mg  650 mg Rectal Q6H PRN Lilo Bronson MD        potassium chloride (KLOR-CON M) extended release tablet 40 mEq  40 mEq Oral PRN Lilo Bronson MD        Or    potassium bicarb-citric acid (EFFER-K) effervescent tablet 40 mEq  40 mEq Oral PRN Lilo Bronson MD        Or    potassium chloride 10 mEq/100 mL IVPB (Peripheral Line)  10 mEq IntraVENous PRN Lilo Bronson MD        magnesium sulfate 2000 mg in 50 mL IVPB premix  2,000 mg IntraVENous PRN Lilo Bronson MD        polyethylene glycol (GLYCOLAX) packet 17 g  17 g Oral Daily PRN Lilo Bronson MD        melatonin disintegrating tablet 5 mg  5 mg Oral Nightly PRN Lilo Bronson MD        calcium carbonate (TUMS) chewable tablet 500 mg  500 mg Oral TID PRN Lilo Bronson MD        buPROPion (WELLBUTRIN XL) extended release tablet 300 mg  300 mg Oral QAM Lilo Bronson MD   300 mg at 08/09/22 2760    Vitamin D (CHOLECALCIFEROL) tablet 2,000 Units  2,000 Units Oral Daily Lilo Bronson MD   2,000 Units at 08/09/22 9442    ferrous gluconate 324 (37.5 Fe) MG tablet 324 mg  324 mg Oral Daily with breakfast Lilo Bronson MD   324 mg at 08/09/22 0925    losartan (COZAAR) tablet 100 mg  100 mg Oral Daily Lilo Bronson MD   100 mg at 08/09/22 5862    melatonin disintegrating tablet 5 mg  5 mg Oral Nightly Lilo Bronson MD   5 mg at 08/08/22 2137    traZODone (DESYREL) tablet 100 mg  100 mg Oral Nightly Lilo Bronson MD   100 mg at 08/08/22 0014     DVT Prophylaxis: Lovenox 40 mg sq daily    Continuous Infusions:   sodium chloride         Intake/Output Summary (Last 24 hours) at 8/9/2022 1505  Last data filed at 8/9/2022 1449  Gross per 24 hour   Intake 1320 ml   Output 1300 ml   Net 20 ml       CBC:   Recent Labs     08/07/22 1956 08/08/22  0030 08/09/22  0145   WBC 6.4 6.9 7.3   HGB 13.5 10.1* 11.8*    198 248       BMP:  Recent Labs     08/07/22  1956 08/08/22  0513 08/09/22  0145    139 138   K 4.2 4.8 4.7    107 103   CO2 23 21* 23   BUN 36* 34* 36*   CREATININE 2.3* 2.2* 2.6*   GLUCOSE 103 99 94       ABGs: No results found for: PHART, PO2ART, LLO1LSY  INR: No results for input(s): INR in the last 72 hours. Objective:   Vitals: BP (!) 139/49   Pulse 75   Temp 98.2 °F (36.8 °C) (Oral)   Resp 17   Ht 5' 10\" (1.778 m)   Wt 216 lb 2 oz (98 kg)   SpO2 96%   BMI 31.01 kg/m²   General appearance: alert, appears stated age and cooperative  Skin: Skin color, texture, turgor normal.   HEENT: Head: Normocephalic, no lesions, without obvious abnormality.   Neck: no adenopathy, no carotid bruit, no JVD, and supple, symmetrical, trachea midline  Lungs: clear to auscultation bilaterally  Heart: regular rate and rhythm, S1, S2 normal, no murmur, click, rub or gallop  Abdomen: soft, non-tender; bowel sounds normal; no masses,  no organomegaly  Extremities: extremities normal, atraumatic, no cyanosis or edema  Lymphatic: No significant lymph node enlargement papable  Neurologic: Mental status: Alert, oriented, thought content appropriate        Assessment & Plan:    Hypertension urgency with headache - cardiology and nephology consulted to manage HTN- echo ordered   Headache- norco prn- not much better- neurology consult   Nausea- phenergan prn   Neck muscle spasm- valium prn, flexeril prn   Pulmonic stenosis and reoperation at age 12 with a cadaver valve  S/P Septal defect repair a  HL- add statin   CKD stage 4    Patient Active Problem List:     Hypertension     CKD (chronic kidney disease) stage 4, GFR 15-29 ml/min ([facility])     Fibromyalgia     Anemia     Depression     Anxiety        See orders   Disposition: home when better and cleared by the consultants     Josh Rothman MD

## 2022-08-09 NOTE — PROGRESS NOTES
Nephrology ([facility]) Progress Note      Patient:  Merced Cordero  YOB: 1990  Date of Service: 8/9/2022  MRN: [id number]   Acct: [de-identified]   Primary Care Physician: RAMONA Domínguez - TAY  Advance Directive: Full Code  Admit Date: 8/7/2022       Hospital Day: 0  Referring Provider: Keith White, *    Patient independently seen and examined, Chart, Consults, Notes, Operative notes, Labs, Cardiology, and Radiology studies reviewed as available. Chief complaint: Abnormal labs. Subjective:  Merced Cordero is a 34 y.o. female for whom we were consulted for evaluation and treatment of acute kidney injury/chronic kidney disease. Patient follows RAMONA Vela in the office for the treatment of chronic kidney disease stage IV. Patient has hypertensive nephrosclerosis and FSGS. She has history of benign essential hypertension for the last several years. 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 Presented with feeling not well, feeling dizzy and was found to have severe systolic and diastolic hypertension. She is now admitted for management of severe hypertension. This morning patient feels better as her blood pressure has significantly improved. But she is still complaining of headache.     Allergies:  Allopurinol, Nsaids, Reglan [metoclopramide], Erythromycin, Penicillins, Percocet [oxycodone-acetaminophen], and Quinolones    Medicines:  Current Facility-Administered Medications   Medication Dose Route Frequency Provider Last Rate Last Admin    sodium bicarbonate tablet 650 mg  650 mg Oral 4x Daily Keith White MD   650 mg at 08/09/22 0917    labetalol (NORMODYNE;TRANDATE) injection 5 mg  5 mg IntraVENous Q4H PRN Keith White MD        hydrALAZINE (APRESOLINE) injection 5 mg  5 mg IntraVENous Q4H PRN Keith White MD        NIFEdipine (PROCARDIA XL) extended release tablet 90 mg  90 mg Oral Daily Oriana Squires MD   90 mg at 08/09/22 6682    carvedilol (COREG) tablet 3.125 mg  3.125 mg Oral BID WC Jayden Moreno MD   3.125 mg at 08/09/22 0924    promethazine (PHENERGAN) injection 6.25 mg  6.25 mg IntraMUSCular Q6H PRN Glenda Prader, MD   6.25 mg at 08/08/22 1411    diazePAM (VALIUM) tablet 5 mg  5 mg Oral Q6H PRN Glenda Prader, MD   5 mg at 08/09/22 1116    cyclobenzaprine (FLEXERIL) tablet 5 mg  5 mg Oral TID PRN Glenda Prader, MD   5 mg at 08/08/22 1540    HYDROcodone-acetaminophen ([facility]) 7.5-325 MG per tablet 1 tablet  1 tablet Oral Q6H PRN Glenda Prader, MD   1 tablet at 08/09/22 1116    sodium chloride flush 0.9 % injection 5-40 mL  5-40 mL IntraVENous 2 times per day Missy Leiva MD   10 mL at 08/09/22 0925    sodium chloride flush 0.9 % injection 5-40 mL  5-40 mL IntraVENous PRN Missy Leiva MD        0.9 % sodium chloride infusion   IntraVENous PRN Missy Leiva MD        enoxaparin (LOVENOX) injection 40 mg  40 mg SubCUTAneous Daily Missy Leiva MD   40 mg at 08/09/22 0557    acetaminophen (TYLENOL) tablet 650 mg  650 mg Oral Q6H PRN Missy Leiva MD   650 mg at 08/08/22 9732    Or    acetaminophen (TYLENOL) suppository 650 mg  650 mg Rectal Q6H PRN Missy Leiva MD        potassium chloride (KLOR-CON M) extended release tablet 40 mEq  40 mEq Oral PRN Missy Leiva MD        Or    potassium bicarb-citric acid (EFFER-K) effervescent tablet 40 mEq  40 mEq Oral PRN Missy Leiva MD        Or    potassium chloride 10 mEq/100 mL IVPB (Peripheral Line)  10 mEq IntraVENous PRANGEL Leiva MD        magnesium sulfate 2000 mg in 50 mL IVPB premix  2,000 mg IntraVENous PRN Missy Leiva MD        polyethylene glycol (GLYCOLAX) packet 17 g  17 g Oral Daily PRN Missy Leiva MD        melatonin disintegrating tablet 5 mg  5 mg Oral Nightly PRN Missy Leiva MD        calcium carbonate (TUMS) chewable tablet 500 mg  500 mg Oral TID PRN Missy Leiva MD        buPROPion (WELLBUTRIN XL) extended release tablet 300 mg  300 mg Oral QAM Ludmila Echeverria MD   300 mg at 22 5356    Vitamin D (CHOLECALCIFEROL) tablet 2,000 Units  2,000 Units Oral Daily Ludmila Echeverria MD   2,000 Units at 22 69    ferrous gluconate 324 (37.5 Fe) MG tablet 324 mg  324 mg Oral Daily with breakfast Ludmila Echeverria MD   324 mg at 22 0925    losartan (COZAAR) tablet 100 mg  100 mg Oral Daily Ludmila Echeverria MD   100 mg at 22 5514    melatonin disintegrating tablet 5 mg  5 mg Oral Nightly Ludmila Echeverria MD   5 mg at 22 2137    traZODone (DESYREL) tablet 100 mg  100 mg Oral Nightly Ludmila Echeverria MD   100 mg at 22 0014       Past Medical History:  Past Medical History:   Diagnosis Date    Anemia     Anxiety     Back pain     CAD (coronary artery disease)     CKD (chronic kidney disease) stage 4, GFR 15-29 ml/min ([facility] [address]     Depression     Fibromyalgia     Hypertension     Pulmonary stenosis        Past Surgical History:  Past Surgical History:   Procedure Laterality Date    ABDOMINOPLASTY      CARDIAC SURGERY  2004    pt has cadaver valve     SECTION      , , 2017    CHOLECYSTECTOMY, LAPAROSCOPIC  2006    DENTAL SURGERY  2006    wisodm tooth extraction    EAR SURGERY      in [address] [place] [address] school - had tubes three times than had an ear drum hole that needed a skin graft done    HERNIA REPAIR      left sided incisonal hernia    HYSTERECTOMY (CERVIX STATUS UNKNOWN)      INCISION AND DRAINAGE OF WOUND      after the caesarian section    LAPAROTOMY      , , 2018       Family History  Family History   Problem Relation Age of Onset    No Known Problems Mother     No Known Problems Father         health informatgion nknwon other thna that he passed    Breast Cancer Half-Sister     No Known Problems Son     No Known Problems Daughter     No Known Problems Daughter        Social History  Social History     Socioeconomic History    Marital status:      Spouse name:  Ibrahima De Leon    Number of children: 3    Years of education: Not on file    Highest education level: Not on file   Occupational History    Occupation: RN     Comment: works at [address] Use    Smoking status: Never    Smokeless tobacco: Never   Vaping Use    Vaping Use: Never used   Substance and Sexual Activity    Alcohol use: Not Currently     Comment: in college    Drug use: Never    Sexual activity: Not on file     Comment: has 3 kids   Other Topics Concern    Not on file   Social History Narrative    CODE STATUS: Full Code    HEALTH CARE PROXY: her , Mr. Ibrahima De Leon, [phone]    AMBULATES: independently    DOMICILED: has stairs in her home to the basement which she rarely uses, lives with her  their three kids and a foster son, has a cat     Social Determinants of Health     Financial Resource Strain: Not on file   Food Insecurity: Not on file   Transportation Needs: Not on file   Physical Activity: Not on file   Stress: Not on file   Social Connections: Not on file   Intimate Partner Violence: Not on file   Housing Stability: Not on file         Review of Systems:  History obtained from chart review and the patient  General ROS: No fever or chills  Respiratory ROS: No cough, shortness of breath, wheezing  Cardiovascular ROS: No chest pain or palpitations  Gastrointestinal ROS: No abdominal pain or melena  Genito-Urinary ROS: No dysuria or hematuria  Musculoskeletal ROS: No joint pain or swelling   14 point ROS reviewed with the patient and negative except as noted above and in the HPI unless unable to obtain.     Objective:  Patient Vitals for the past 24 hrs:   BP Temp Temp src Pulse Resp SpO2 Weight   08/09/22 0553 -- -- -- -- -- -- 216 lb 2 oz (98 kg)   08/09/22 0550 (!) 120/47 98.8 °F (37.1 °C) Temporal 80 16 98 % --   08/09/22 0533 -- -- -- -- 19 -- --   08/09/22 0503 -- -- -- -- 19 -- --   08/08/22 2256 -- -- -- 79 -- -- --   08/08/22 2220 (!) 122/45 97.3 °F (36.3 hours.  ABGs: No results for input(s): PH, PCO2, PO2, HCO3, O2SAT in the last 72 hours. CRP:  No results for input(s): CRP in the last 72 hours. Sed Rate:  No results for input(s): SEDRATE in the last 72 hours. Cultures:   No results for input(s): CULTURE in the last 72 hours. No results for input(s): BC, Chalmer Stacks in the last 72 hours. No results for input(s): CXSURG in the last 72 hours. Radiology reports as per the Radiologist  Radiology: No results found. Assessment   1. Stage IV chronic kidney disease baseline. 2.  Hypertensive nephrosclerosis/FSGS. 3.  Poorly controlled systolic and diastolic hypertension. 4.  Anemia of chronic kidney disease. Plan:  1. Continue current medications. 2.  Monitor renal function which is actually close to her baseline. 3.  Possible discharge once feasible.     Dawn Min MD  08/09/22  11:31 AM

## 2022-08-09 NOTE — PLAN OF CARE
Problem: Discharge Planning  Goal: Discharge to home or other facility with appropriate resources  Outcome: Progressing  Flowsheets (Taken 8/8/2022 2256)  Discharge to home or other facility with appropriate resources: Identify barriers to discharge with patient and caregiver     Problem: Safety - Adult  Goal: Free from fall injury  Outcome: Progressing     Problem: Pain  Goal: Verbalizes/displays adequate comfort level or baseline comfort level  Outcome: Progressing     Problem: ABCDS Injury Assessment  Goal: Absence of physical injury  Outcome: Progressing     Problem: Chronic Conditions and Co-morbidities  Goal: Patient's chronic conditions and co-morbidity symptoms are monitored and maintained or improved  Outcome: Progressing  Flowsheets (Taken 8/8/2022 2256)  Care Plan - Patient's Chronic Conditions and Co-Morbidity Symptoms are Monitored and Maintained or Improved: Monitor and assess patient's chronic conditions and comorbid symptoms for stability, deterioration, or improvement

## 2022-08-10 VITALS
SYSTOLIC BLOOD PRESSURE: 147 MMHG | RESPIRATION RATE: 18 BRPM | WEIGHT: 216.13 LBS | DIASTOLIC BLOOD PRESSURE: 43 MMHG | TEMPERATURE: 97.7 F | OXYGEN SATURATION: 96 % | BODY MASS INDEX: 30.94 KG/M2 | HEIGHT: 70 IN | HEART RATE: 75 BPM

## 2022-08-10 LAB
ALBUMIN SERPL-MCNC: 3.7 G/DL (ref 3.5–5.2)
ALP BLD-CCNC: 78 U/L (ref 35–104)
ALT SERPL-CCNC: 22 U/L (ref 5–33)
ANION GAP SERPL CALCULATED.3IONS-SCNC: 11 MMOL/L (ref 7–19)
AST SERPL-CCNC: 18 U/L (ref 5–32)
BILIRUB SERPL-MCNC: 0.5 MG/DL (ref 0.2–1.2)
BUN BLDV-MCNC: 36 MG/DL (ref 6–20)
CALCIUM SERPL-MCNC: 9.4 MG/DL (ref 8.6–10)
CHLORIDE BLD-SCNC: 103 MMOL/L (ref 98–111)
CO2: 23 MMOL/L (ref 22–29)
CREAT SERPL-MCNC: 2.5 MG/DL (ref 0.5–0.9)
GFR AFRICAN AMERICAN: 27
GFR NON-AFRICAN AMERICAN: 22
GLUCOSE BLD-MCNC: 86 MG/DL (ref 74–109)
HCT VFR BLD CALC: 34.6 % (ref 37–47)
HEMOGLOBIN: 11.3 G/DL (ref 12–16)
MCH RBC QN AUTO: 29.7 PG (ref 27–31)
MCHC RBC AUTO-ENTMCNC: 32.7 G/DL (ref 33–37)
MCV RBC AUTO: 91.1 FL (ref 81–99)
PDW BLD-RTO: 13.3 % (ref 11.5–14.5)
PLATELET # BLD: 232 K/UL (ref 130–400)
PMV BLD AUTO: 10.3 FL (ref 9.4–12.3)
POTASSIUM SERPL-SCNC: 4.6 MMOL/L (ref 3.5–5)
RBC # BLD: 3.8 M/UL (ref 4.2–5.4)
SEDIMENTATION RATE, ERYTHROCYTE: 13 MM/HR (ref 0–20)
SODIUM BLD-SCNC: 137 MMOL/L (ref 136–145)
TOTAL PROTEIN: 5.9 G/DL (ref 6.6–8.7)
WBC # BLD: 6.4 K/UL (ref 4.8–10.8)

## 2022-08-10 PROCEDURE — 2580000003 HC RX 258: Performed by: HOSPITALIST

## 2022-08-10 PROCEDURE — 6370000000 HC RX 637 (ALT 250 FOR IP): Performed by: PSYCHIATRY & NEUROLOGY

## 2022-08-10 PROCEDURE — 6360000002 HC RX W HCPCS: Performed by: HOSPITALIST

## 2022-08-10 PROCEDURE — 80053 COMPREHEN METABOLIC PANEL: CPT

## 2022-08-10 PROCEDURE — 6370000000 HC RX 637 (ALT 250 FOR IP): Performed by: INTERNAL MEDICINE

## 2022-08-10 PROCEDURE — 85652 RBC SED RATE AUTOMATED: CPT

## 2022-08-10 PROCEDURE — 2140000000 HC CCU INTERMEDIATE R&B

## 2022-08-10 PROCEDURE — 6370000000 HC RX 637 (ALT 250 FOR IP): Performed by: HOSPITALIST

## 2022-08-10 PROCEDURE — 36415 COLL VENOUS BLD VENIPUNCTURE: CPT

## 2022-08-10 PROCEDURE — 85027 COMPLETE CBC AUTOMATED: CPT

## 2022-08-10 PROCEDURE — 99232 SBSQ HOSP IP/OBS MODERATE 35: CPT | Performed by: PSYCHIATRY & NEUROLOGY

## 2022-08-10 RX ORDER — CARVEDILOL 3.12 MG/1
3.12 TABLET ORAL 2 TIMES DAILY WITH MEALS
Qty: 60 TABLET | Refills: 3 | Status: SHIPPED | OUTPATIENT
Start: 2022-08-10

## 2022-08-10 RX ORDER — SODIUM BICARBONATE 650 MG/1
650 TABLET ORAL 2 TIMES DAILY
Qty: 60 TABLET | Refills: 0 | Status: SHIPPED | OUTPATIENT
Start: 2022-08-10 | End: 2022-09-09

## 2022-08-10 RX ORDER — NIFEDIPINE 90 MG/1
90 TABLET, EXTENDED RELEASE ORAL DAILY
Qty: 90 TABLET | Refills: 1 | Status: SHIPPED | OUTPATIENT
Start: 2022-08-10

## 2022-08-10 RX ADMIN — NIFEDIPINE 90 MG: 30 TABLET, EXTENDED RELEASE ORAL at 09:21

## 2022-08-10 RX ADMIN — LOSARTAN POTASSIUM 100 MG: 50 TABLET, FILM COATED ORAL at 09:20

## 2022-08-10 RX ADMIN — BUPROPION HYDROCHLORIDE 300 MG: 150 TABLET, EXTENDED RELEASE ORAL at 09:20

## 2022-08-10 RX ADMIN — Medication 324 MG: at 09:21

## 2022-08-10 RX ADMIN — SODIUM BICARBONATE 650 MG: 650 TABLET ORAL at 09:20

## 2022-08-10 RX ADMIN — BUTALBITAL, ACETAMINOPHEN, AND CAFFEINE 1 TABLET: 50; 325; 40 TABLET ORAL at 11:58

## 2022-08-10 RX ADMIN — SODIUM BICARBONATE 650 MG: 650 TABLET ORAL at 13:01

## 2022-08-10 RX ADMIN — SODIUM CHLORIDE, PRESERVATIVE FREE 10 ML: 5 INJECTION INTRAVENOUS at 09:22

## 2022-08-10 RX ADMIN — ATORVASTATIN CALCIUM 20 MG: 20 TABLET, FILM COATED ORAL at 09:20

## 2022-08-10 RX ADMIN — ENOXAPARIN SODIUM 40 MG: 100 INJECTION SUBCUTANEOUS at 09:25

## 2022-08-10 RX ADMIN — Medication 2000 UNITS: at 09:20

## 2022-08-10 RX ADMIN — CARVEDILOL 3.12 MG: 6.25 TABLET, FILM COATED ORAL at 09:20

## 2022-08-10 RX ADMIN — ACETAMINOPHEN 325MG 650 MG: 325 TABLET ORAL at 09:21

## 2022-08-10 ASSESSMENT — PAIN - FUNCTIONAL ASSESSMENT
PAIN_FUNCTIONAL_ASSESSMENT: ACTIVITIES ARE NOT PREVENTED
PAIN_FUNCTIONAL_ASSESSMENT: ACTIVITIES ARE NOT PREVENTED

## 2022-08-10 ASSESSMENT — PAIN SCALES - GENERAL
PAINLEVEL_OUTOF10: 1
PAINLEVEL_OUTOF10: 1
PAINLEVEL_OUTOF10: 3
PAINLEVEL_OUTOF10: 3

## 2022-08-10 ASSESSMENT — PAIN DESCRIPTION - DESCRIPTORS
DESCRIPTORS: ACHING
DESCRIPTORS: ACHING

## 2022-08-10 ASSESSMENT — PAIN DESCRIPTION - ORIENTATION
ORIENTATION: ANTERIOR;POSTERIOR
ORIENTATION: POSTERIOR;ANTERIOR

## 2022-08-10 ASSESSMENT — PAIN DESCRIPTION - LOCATION
LOCATION: HEAD
LOCATION: HEAD

## 2022-08-10 NOTE — PROGRESS NOTES
Patient educated on discharge instructions including mediations and follow-up appointments. Patient verbalized understanding of the teaching. Patient discharged to home.

## 2022-08-10 NOTE — PROGRESS NOTES
[address]  [place], [place]  Phone [phone]     Neurology Progress Note  8/10/2022 10:04 AM  Information:   Patient Name: Teresita Garcia  :   1990  Age:   34 y.o. MRN:   [id number]  Account #:  [de-identified]  Admit Date:   2022  Today:  8/10/22     ADMIT DX:   Hypertensive urgency    Subjective:     Teresita Garcia is a 34y.o. year old woman with a history of hypertension and stage 4 chronic kidney disease who was admitted  with hypertensive urgency and worsening kidney function. Her BP is better but she continues to have a severe headache. Interval History:   She still has a right occipital headache that radiates to behind the eye. It is better today. She is able to move, ambulate, have lights on, eat.       Objective:     Past Medical History:  Past Medical History:   Diagnosis Date    Anemia     Anxiety     Back pain     CAD (coronary artery disease)     CKD (chronic kidney disease) stage 4, GFR 15-29 ml/min ([facility])     Depression     Fibromyalgia     Hypertension     Pulmonary stenosis        Past Surgical History:   Procedure Laterality Date    ABDOMINOPLASTY  2019    CARDIAC SURGERY  2004    pt has cadaver valve     SECTION      , , 2017    CHOLECYSTECTOMY, LAPAROSCOPIC  2006    DENTAL SURGERY  2006    wisodm tooth extraction    EAR SURGERY      in [address] [place] [address] - had tubes three times than had an ear drum hole that needed a skin graft done    HERNIA REPAIR      left sided incisonal hernia    HYSTERECTOMY (CERVIX STATUS UNKNOWN)      INCISION AND DRAINAGE OF WOUND      after the caesarian section    LAPAROTOMY      , ,        Family History   Problem Relation Age of Onset    No Known Problems Mother     No Known Problems Father         health informatgion nknwon other thna that he passed    Breast Cancer Half-Sister     No Known Problems Son     No Known Problems Daughter     No Known Problems Daughter Social History     Socioeconomic History    Marital status:      Spouse name: Mr. Daniel Mandel    Number of children: 3    Years of education: Not on file    Highest education level: Not on file   Occupational History    Occupation: RN     Comment: works at [employer] Use    Smoking status: Never    Smokeless tobacco: Never   Vaping Use    Vaping Use: Never used   Substance and Sexual Activity    Alcohol use: Not Currently     Comment: in college    Drug use: Never    Sexual activity: Not on file     Comment: has 3 kids   Other Topics Concern    Not on file   Social History Narrative    CODE STATUS: Full Code    HEALTH CARE PROXY: her , Mr. Daniel Mandel, [phone]    AMBULATES: independently    DOMICILED: has stairs in her home to the basement which she rarely uses, lives with her  their three kids and a foster son, has a cat     Social Determinants of Health     Financial Resource Strain: Not on file   Food Insecurity: Not on file   Transportation Needs: Not on file   Physical Activity: Not on file   Stress: Not on file   Social Connections: Not on file   Intimate Partner Violence: Not on file   Housing Stability: Not on file       Medications:   sodium chloride        atorvastatin  20 mg Oral Daily    sodium bicarbonate  650 mg Oral 4x Daily    NIFEdipine  90 mg Oral Daily    carvedilol  3.125 mg Oral BID WC    sodium chloride flush  5-40 mL IntraVENous 2 times per day    enoxaparin  40 mg SubCUTAneous Daily    buPROPion  300 mg Oral QAM    Vitamin D  2,000 Units Oral Daily    ferrous gluconate  324 mg Oral Daily with breakfast    losartan  100 mg Oral Daily    melatonin  5 mg Oral Nightly    traZODone  100 mg Oral Nightly       Diagnostic Studies:  Reviewed all labs/diagnositcs since last 24hrs:  Recent Results (from the past 24 hour(s))   CBC    Collection Time: 08/10/22  1:30 AM   Result Value Ref Range    WBC 6.4 4.8 - 10.8 K/uL    RBC 3.80 (L) 4.20 - 5.40 M/uL    Hemoglobin 11.3 (L) 12.0 - 16.0 g/dL    Hematocrit 34.6 (L) 37.0 - 47.0 %    MCV 91.1 81.0 - 99.0 fL    MCH 29.7 27.0 - 31.0 pg    MCHC 32.7 (L) 33.0 - 37.0 g/dL    RDW 13.3 11.5 - 14.5 %    Platelets 347 093 - 124 K/uL    MPV 10.3 9.4 - 12.3 fL   Comprehensive Metabolic Panel    Collection Time: 08/10/22  1:30 AM   Result Value Ref Range    Sodium 137 136 - 145 mmol/L    Potassium 4.6 3.5 - 5.0 mmol/L    Chloride 103 98 - 111 mmol/L    CO2 23 22 - 29 mmol/L    Anion Gap 11 7 - 19 mmol/L    Glucose 86 74 - 109 mg/dL    BUN 36 (H) 6 - 20 mg/dL    Creatinine 2.5 (H) 0.5 - 0.9 mg/dL    GFR Non-African American 22 (A) >60    GFR  27 (L) >59    Calcium 9.4 8.6 - 10.0 mg/dL    Total Protein 5.9 (L) 6.6 - 8.7 g/dL    Albumin 3.7 3.5 - 5.2 g/dL    Total Bilirubin 0.5 0.2 - 1.2 mg/dL    Alkaline Phosphatase 78 35 - 104 U/L    ALT 22 5 - 33 U/L    AST 18 5 - 32 U/L   Sedimentation Rate    Collection Time: 08/10/22  1:30 AM   Result Value Ref Range    Sed Rate 13 0 - 20 mm/Hr     Narrative   CT OF THE BRAIN WITHOUT IV CONTRAST   CLINICAL HISTORY: Headache   TECHNIQUE:   Exam is performed without intravenous contrast.   Per PQRS, CT exam is performed using one or more of the following dose reduction   techniques: Automated exposure control, adjustment of the mA and/or KV according   to patient size, or use of iterative reconstruction techniques. COMPARISON: None   FINDINGS:   Negative for acute bleed. Negative for acute infarct. Normal sized ventricles. Extra-axial compartments are normal.   Sinuses are clear. Mastoid air spaces are clear. Middle ear cavities are clear. Calvarium is intact. Scalp is unremarkable. Impression   1. Negative for acute intracranial process. Narrative   CT OF THE CERVICAL SPINE WITHOUT IV CONTRAST   CLINICAL HISTORY: Neck pain   TECHNIQUE:   Serial axial images obtained. Sagittal reconstructed images obtained. Coronal reconstructed images obtained. Exam is performed without intravenous contrast.   Per PQRS, CT exam is performed using one or more of the following dose reduction   techniques: Automated exposure control, adjustment of the mA and/or KV according   to patient size, or use of iterative reconstruction techniques. COMPARISON: None. FINDINGS:   Normal alignment of the cervical spine is seen. Negative for acute compression deformity. Degenerative changes of the odontoid. No significant neuroforaminal or spinal   stenosis seen. Soft tissues are unremarkable. Negative for radiodense foreign body. Included lung apices are grossly unremarkable. Impression   1. Negative for acute compression fracture. Diet:  ADULT DIET; Regular; Low Fat/Low Chol/High Fiber/LISSA    Examination:  Vitals: /66   Pulse 86   Temp 97.7 °F (36.5 °C) (Temporal)   Resp 18   Ht 5' 10\" (1.778 m)   Wt 216 lb 2 oz (98 kg)   SpO2 97%   BMI 31.01 kg/m²    Intake/Output Summary (Last 24 hours) at 8/10/2022 1004  Last data filed at 8/10/2022 1405  Gross per 24 hour   Intake 1780 ml   Output 1200 ml   Net 580 ml     General appearance: alert, appears stated age and cooperative  Skin: Skin color, texture, turgor normal. No rashes or lesions  HEENT: Head: Normal, normocephalic, atraumatic. Neck:no adenopathy, no carotid bruit, no JVD, supple, symmetrical, trachea midline, and thyroid not enlarged, symmetric, no tenderness/mass/nodules  Lungs: clear to auscultation bilaterally  Heart: regular rate and rhythm, S1, S2 normal, no murmur, click, rub or gallop  Extremities: extremities normal, atraumatic, no cyanosis or edema  Neurologic:  Alert, oriented. No dysarthria. Speech is fluent. EOMI, PERRL, VFF. No facial weakness. Limb strength is normal.  No pronator drift. DRapid alternating movements are normal.  Finger to nose testing shows no dysmetria. Assessment:   1. Headache and neck pain.   She has chronic daily headaches worsened with the

## 2022-08-10 NOTE — PROGRESS NOTES
Nephrology ([facility]) Progress Note      Patient:  Ayleen José  YOB: 1990  Date of Service: 8/10/2022  MRN: [id number]   Acct: [de-identified]   Primary Care Physician: Mellie Snellen, APRN - NP  Advance Directive: Full Code  Admit Date: 8/7/2022       Hospital Day: 0  Referring Provider: Nataly Bernard MD    Patient independently seen and examined, Chart, Consults, Notes, Operative notes, Labs, Cardiology, and Radiology studies reviewed as available. Chief complaint: Abnormal labs. Subjective:  Ayleen José is a 34 y.o. female for whom we were consulted for evaluation and treatment of acute kidney injury/chronic kidney disease. Patient follows RAMONA Boggs in the office for the treatment of chronic kidney disease stage IV. Patient has hypertensive nephrosclerosis and FSGS. She has history of benign essential hypertension for the last several years. 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 Presented with feeling not well, feeling dizzy and was found to have severe systolic and diastolic hypertension. She is now admitted for management of severe hypertension. This morning patient feels better.   Her blood pressure is significantly improved    Allergies:  Allopurinol, Nsaids, Reglan [metoclopramide], Erythromycin, Penicillins, Percocet [oxycodone-acetaminophen], and Quinolones    Medicines:  Current Facility-Administered Medications   Medication Dose Route Frequency Provider Last Rate Last Admin    atorvastatin (LIPITOR) tablet 20 mg  20 mg Oral Daily Valentino Coria, MD   20 mg at 08/10/22 0920    butalbital-acetaminophen-caffeine (FIORICET, ESGIC) per tablet 1 tablet  1 tablet Oral Q4H PRN Bishnu Saeed MD   1 tablet at 08/09/22 2140    sodium bicarbonate tablet 650 mg  650 mg Oral 4x Daily Valentino Coria, MD   650 mg at 08/10/22 0920    labetalol (NORMODYNE;TRANDATE) injection 5 mg  5 mg IntraVENous Q4H PRN Valentino Coria, MD hydrALAZINE (APRESOLINE) injection 5 mg  5 mg IntraVENous Q4H PRN Markus Rdz MD        NIFEdipine (PROCARDIA XL) extended release tablet 90 mg  90 mg Oral Daily Dangelo Cancino MD   90 mg at 08/10/22 0921    carvedilol (COREG) tablet 3.125 mg  3.125 mg Oral BID WC Dangelo Cancino MD   3.125 mg at 08/10/22 0920    promethazine (PHENERGAN) injection 6.25 mg  6.25 mg IntraMUSCular Q6H PRN Markus Rdz MD   6.25 mg at 08/08/22 1411    diazePAM (VALIUM) tablet 5 mg  5 mg Oral Q6H PRN Markus Rdz MD   5 mg at 08/09/22 1116    cyclobenzaprine (FLEXERIL) tablet 5 mg  5 mg Oral TID PRN Markus Rdz MD   5 mg at 08/08/22 1540    HYDROcodone-acetaminophen (Jose Osborn) 7.5-325 MG per tablet 1 tablet  1 tablet Oral Q6H PRN Markus Rdz MD   1 tablet at 08/09/22 1116    sodium chloride flush 0.9 % injection 5-40 mL  5-40 mL IntraVENous 2 times per day Tricia Clark MD   10 mL at 08/10/22 4113    sodium chloride flush 0.9 % injection 5-40 mL  5-40 mL IntraVENous PRN Tricia Clark MD        0.9 % sodium chloride infusion   IntraVENous PRN Tricia Clark MD        enoxaparin (LOVENOX) injection 40 mg  40 mg SubCUTAneous Daily Tricia Clark MD   40 mg at 08/10/22 0925    acetaminophen (TYLENOL) tablet 650 mg  650 mg Oral Q6H PRN Tricia Clark MD   650 mg at 08/10/22 7754    Or    acetaminophen (TYLENOL) suppository 650 mg  650 mg Rectal Q6H PRN Tricia Clark MD        potassium chloride (KLOR-CON M) extended release tablet 40 mEq  40 mEq Oral PRN Tricia Clark MD        Or    potassium bicarb-citric acid (EFFER-K) effervescent tablet 40 mEq  40 mEq Oral PRN Tricia Clark MD        Or    potassium chloride 10 mEq/100 mL IVPB (Peripheral Line)  10 mEq IntraVENous PRN Tricia Clark MD        magnesium sulfate 2000 mg in 50 mL IVPB premix  2,000 mg IntraVENous PRN Tricia Clark MD        polyethylene glycol (GLYCOLAX) packet 17 g  17 g Oral Daily PRN Tricia Clark MD        melatonin disintegrating tablet 5 mg  5 mg Oral Nightly PRN Chan Pinto MD        calcium carbonate (TUMS) chewable tablet 500 mg  500 mg Oral TID PRN Chan Pinto MD        buPROPion (WELLBUTRIN XL) extended release tablet 300 mg  300 mg Oral QAM Chan Pinto MD   300 mg at 08/10/22 0920    Vitamin D (CHOLECALCIFEROL) tablet 2,000 Units  2,000 Units Oral Daily Chan Pinto MD   2,000 Units at 08/10/22 0920    ferrous gluconate 324 (37.5 Fe) MG tablet 324 mg  324 mg Oral Daily with breakfast Chan Pinto MD   324 mg at 08/10/22 2685    losartan (COZAAR) tablet 100 mg  100 mg Oral Daily Chan Pinto MD   100 mg at 08/10/22 0920    melatonin disintegrating tablet 5 mg  5 mg Oral Nightly Chan Pinto MD   5 mg at 22    traZODone (DESYREL) tablet 100 mg  100 mg Oral Nightly Chan Pinto MD   100 mg at 22       Past Medical History:  Past Medical History:   Diagnosis Date    Anemia     Anxiety     Back pain     CAD (coronary artery disease)     CKD (chronic kidney disease) stage 4, GFR 15-29 ml/min ([facility])     Depression     Fibromyalgia     Hypertension     Pulmonary stenosis        Past Surgical History:  Past Surgical History:   Procedure Laterality Date    ABDOMINOPLASTY  2019    CARDIAC SURGERY  2004    pt has cadaver valve     SECTION      , , 2017    CHOLECYSTECTOMY, LAPAROSCOPIC  2006    DENTAL SURGERY      wisodm tooth extraction    EAR SURGERY      in [address] [place] [address] school - had tubes three times than had an ear drum hole that needed a skin graft done    HERNIA REPAIR      left sided incisonal hernia    HYSTERECTOMY (CERVIX STATUS UNKNOWN)      INCISION AND DRAINAGE OF WOUND      after the caesarian section    LAPAROTOMY      2006, 2009, 2018       Family History  Family History   Problem Relation Age of Onset    No Known Problems Mother     No Known Problems Father         health informatgion nknwon other thna that he passed    Breast Cancer Half-Sister Temporal 81 18 97 %   08/09/22 2216 (!) 125/46 97.2 °F (36.2 °C) Temporal 74 16 94 %   08/09/22 2017 -- -- -- 80 -- --   08/09/22 1833 (!) 116/42 -- -- 82 -- 97 %   08/09/22 1826 (!) 107/50 -- -- 85 -- --   08/09/22 1651 (!) 140/43 98.6 °F (37 °C) Oral 84 16 97 %   08/09/22 1147 (!) 139/49 98.2 °F (36.8 °C) Oral 75 17 96 %       Intake/Output Summary (Last 24 hours) at 8/10/2022 1119  Last data filed at 8/10/2022 0959  Gross per 24 hour   Intake 1780 ml   Output 1200 ml   Net 580 ml     General: awake/alert   HEENT: Normocephalic atraumatic head  Chest:  clear to auscultation bilaterally  CVS: regular rate and rhythm  Abdominal: soft, nontender, normal bowel sounds  Extremities: no cyanosis or edema  Skin: warm and dry without rash      Labs:  BMP:   Recent Labs     08/07/22 1956 08/08/22  0513 08/09/22  0145 08/10/22  0130    139 138 137   K 4.2 4.8 4.7 4.6    107 103 103   CO2 23 21* 23 23   PHOS 3.9  --   --   --    BUN 36* 34* 36* 36*   CREATININE 2.3* 2.2* 2.6* 2.5*   CALCIUM 9.5 9.3 9.5 9.4     CBC:   Recent Labs     08/08/22  0030 08/09/22  0145 08/10/22  0130   WBC 6.9 7.3 6.4   HGB 10.1* 11.8* 11.3*   HCT 30.0* 36.9* 34.6*   MCV 86.0 90.2 91.1    248 232     LIVER PROFILE:   Recent Labs     08/07/22 1956 08/09/22 0145 08/10/22  0130   AST 21 19 18   ALT 25 23 22   BILITOT <0.2 0.3 0.5   ALKPHOS 108* 88 78     PT/INR: No results for input(s): PROTIME, INR in the last 72 hours. APTT: No results for input(s): APTT in the last 72 hours. BNP:  No results for input(s): BNP in the last 72 hours. Ionized Calcium:No results for input(s): IONCA in the last 72 hours.   Magnesium:  Recent Labs     08/07/22 1956   MG 2.3     Phosphorus:  Recent Labs     08/07/22 1956   PHOS 3.9     HgbA1C:   Recent Labs     08/07/22 1956   LABA1C 5.2     Hepatic:   Recent Labs     08/07/22 1956 08/09/22  0145 08/10/22  0130   ALKPHOS 108* 88 78   ALT 25 23 22   AST 21 19 18   PROT 7.5 6.2* 5.9*   BILITOT <0.2 0.3 0.5   LABALBU 4.5 3.9 3.7     Lactic Acid: No results for input(s): LACTA in the last 72 hours. Troponin: No results for input(s): CKTOTAL, CKMB, TROPONINT in the last 72 hours. ABGs: No results for input(s): PH, PCO2, PO2, HCO3, O2SAT in the last 72 hours. CRP:  No results for input(s): CRP in the last 72 hours. Sed Rate:    Recent Labs     08/10/22  0130   SEDRATE 13         Cultures:   No results for input(s): CULTURE in the last 72 hours. No results for input(s): BC, [place] in the last 72 hours. No results for input(s): CXSURG in the last 72 hours. Radiology reports as per the Radiologist  Radiology: No results found. Assessment   1. Stage IV chronic kidney disease baseline. 2.  Hypertensive nephrosclerosis/FSGS. 3.  Poorly controlled systolic and diastolic hypertension. 4.  Anemia of chronic kidney disease. Plan:  1. Continue current medications. 2.  Monitor renal function which is actually close to her baseline. 3.  Possible discharge once feasible.     Lucía Garcia MD  08/10/22  11:19 AM

## 2022-08-10 NOTE — DISCHARGE SUMMARY
Discharge Summary      Date:8/10/2022        Patient Jax Rodriguez     YOB: 1990     Age:32 y.o. Admit Date:8/7/2022   Admission Condition:fair   Discharged Condition:stable  Discharge Date: 08/10/22       Discharge Diagnoses   Principal Problem:    Hypertensive urgency  Active Problems:    Hypertension    Stage 4 chronic kidney disease ([address]    Pulmonary stenosis    Near syncope    Intractable migraine without aura and with status migrainosus    Chronic daily headache    Neck pain  Resolved Problems:    * No resolved hospital problems. [facility] Stay   Narrative of Hospital Course: 24-year-old lady with a history of CKD stage IV, coronary artery disease, congenital heart disease with pulmonary stenosis status post pulmonic shield implant and then subsequent replacement of pulmonic stenotic valve with removal of the stent using cadaver valve, and at the same time there was closure of certain septal defect, hypertension, fibromyalgia, depression, anxiety, who presented to the hospital 8/7/2022 with concerns of uncontrolled blood pressure and dizziness. Patient was admitted in-house, BP medication readjusted, patient initiated on Coreg as well as nifedipine, echocardiogram was obtained which showed normal left ventricular cavity and overall normal systolic function with a EF of 60%, bubble study was normal.  Patient's blood pressure was better controlled, was also evaluated by nephrology given underlining CKD stage IV initiated on sodium bicarb tabs. On day of discharge patient blood pressure better controlled with systolic in the 957K and diastolic in the 91U. Encouraged to continue blood pressure monitoring at home and follow-up with nephrologist and cardiology with readings as medication might be titrated as needed. To follow-up with PCP for continuous management of chronic medical problems.         Physical Examination:  General: Well-developed, no acute distress, sitting upright in chair.  HEENT: Atraumatic normocephalic, range of motion normal, no JVD, no tracheal deviation noted. Cardiac: Regular regular rhythm noted on telemetry  Respiratory: No use of accessory muscles   Extremities: No edema, moves all extremities  Psych: Affect normal and good eye contact, behavioral normal.        Consultants:   IP CONSULT TO NEPHROLOGY  IP CONSULT TO CARDIOLOGY    Time Spent on Discharge:  25 minutes were spent in patient examination, evaluation, counseling as well as medication reconciliation, prescriptions for required medications, discharge plan and follow up. Surgeries/Procedures Performed:   NONE      Significant Diagnostic Studies:   Recent Labs:  CBC:   Lab Results   Component Value Date/Time    WBC 6.4 08/10/2022 01:30 AM    RBC 3.80 08/10/2022 01:30 AM    HGB 11.3 08/10/2022 01:30 AM    HCT 34.6 08/10/2022 01:30 AM    MCV 91.1 08/10/2022 01:30 AM    MCH 29.7 08/10/2022 01:30 AM    MCHC 32.7 08/10/2022 01:30 AM    RDW 13.3 08/10/2022 01:30 AM     08/10/2022 01:30 AM     BMP:    Lab Results   Component Value Date/Time    GLUCOSE 86 08/10/2022 01:30 AM     08/10/2022 01:30 AM    K 4.6 08/10/2022 01:30 AM    K 4.8 08/08/2022 05:13 AM     08/10/2022 01:30 AM    CO2 23 08/10/2022 01:30 AM    ANIONGAP 11 08/10/2022 01:30 AM    BUN 36 08/10/2022 01:30 AM    CREATININE 2.5 08/10/2022 01:30 AM    CALCIUM 9.4 08/10/2022 01:30 AM    LABGLOM 22 08/10/2022 01:30 AM    GFRAA 27 08/10/2022 01:30 AM       Radiology Last 7 Days:  CT Head WO Contrast    Result Date: 8/9/2022  1. Negative for acute intracranial process. CT CERVICAL SPINE WO CONTRAST    Result Date: 8/9/2022  1. Negative for acute compression fracture.       Discharge Plan   Disposition: Home    Provider Follow-Up:   RAMONA Chandler - [address]  [address]  [phone]    Follow up on 9/6/2022  9:00 am    RAMONA Neves NP  [address] [facility] [address]  [phone]    Follow up on 8/15/2022  Hospital follow-up appointment is scheduled for 8/15 at 9am.       Patient Instructions   Diet: cardiac diet and renal diet    Activity: activity as tolerated      Discharge Medications         Medication List        START taking these medications      carvedilol 3.125 MG tablet  Commonly known as: COREG  Take 1 tablet by mouth in the morning and 1 tablet in the evening. Take with meals. NIFEdipine 90 MG extended release tablet  Commonly known as: PROCARDIA XL  Take 1 tablet by mouth in the morning.     sodium bicarbonate 650 MG tablet  Take 1 tablet by mouth in the morning and 1 tablet before bedtime.             CONTINUE taking these medications      Avapro 300 MG tablet  Generic drug: irbesartan     buPROPion 300 MG extended release tablet  Commonly known as: WELLBUTRIN XL     cloNIDine 0.1 MG tablet  Commonly known as: CATAPRES     famotidine 40 MG tablet  Commonly known as: PEPCID     ferrous gluconate 324 (38 Fe) MG tablet  Commonly known as: FERGON     loratadine 10 MG capsule  Commonly known as: CLARITIN     melatonin 5 MG Tabs tablet     traZODone 100 MG tablet  Commonly known as: DESYREL     Vitamin D3 125 MCG (5000 UT) Tabs               Where to Get Your Medications        These medications were sent to [facility], [address] [place] [address]  [address] [place] [address], [address] [place] [address]      Phone: [phone]   carvedilol 3.125 MG tablet  NIFEdipine 90 MG extended release tablet  sodium bicarbonate 650 MG tablet         Electronically signed by Nic Hines MD on 8/10/22 at 5:50 PM CDT

## 2022-08-11 ENCOUNTER — CARE COORDINATION (OUTPATIENT)
Dept: CASE MANAGEMENT | Age: 32
End: 2022-08-11

## 2022-08-11 LAB
ALDOSTERONE/RENIN ACTIVITY CALCULATION: 28 RATIO
ALDOSTERONE: 16.8 NG/DL
RENIN ACTIVITY: 0.6 NG/ML/HR

## 2022-08-11 NOTE — CARE COORDINATION
[address] [facility] Transitions Initial Follow Up Call    Call within 2 business days of discharge: Yes    Patient: Teresita Garcia Patient : 1990   MRN: [id number]  Reason for Admission: Hypertensive Urgency  Discharge Date: 8/10/22 RARS: Readmission Risk Score: 13.9      Last Discharge [facility]       Date Complaint Diagnosis Description Type Department Provider    22 Hypertension Hypertensive urgency . .. ED to Hosp-Admission (Discharged) (ADMITTED) L CONSTANZA Hines MD; Ayah Beckham. .. Spoke with: [address] [place] [address] 280: [phone]      Non-face-to-face services provided:  Reviewed encounter information for continuity of care prior to follow up Care Transitions Coordination phone call - chart notes, consults, progress notes, test results, med list, appointments, AVS, other information. Care Transitions 24 Hour Call    Schedule Follow Up Appointment with PCP: Completed  Do you have a copy of your discharge instructions?: Yes  Do you have all of your prescriptions and are they filled?: Yes  Have you been contacted by a [address]?: No  Have you scheduled your follow up appointment?: Yes  How are you going to get to your appointment?: Car - drive self  Do you feel like you have everything you need to keep you well at home?: Yes  Care Transitions Interventions       Placed a call to the number listed for patient for an initial follow up call for Care Transitions Coordination following discharge from the hospital.  Introduced myself and explained the purpose of my call as well as verifying name, date of birth of patient. Spoke with patient regarding hospitalization, discharge and status thus far. She reported that she is doing well. She said that she is checking her blood pressure twice daily and it is back within normal limits. She denied any other abnormal symptoms. She said she has all of her meds and is taking them as ordered. Med review completed.   She performed with patient, who verbalizes understanding of administration of home medications. CTN provided contact information. No further follow-up call indicated based on severity of symptoms and risk factors.     Follow Up  Future Appointments   Date Time Provider Gabbie Caban   9/6/2022  9:00 AM Melburn Ganser, APRN - NP [facility] HERNANDE[facility]       Maylin Jurado RN

## 2022-09-28 ENCOUNTER — OFFICE VISIT (OUTPATIENT)
Dept: CARDIOLOGY | Facility: CLINIC | Age: 32
End: 2022-09-28

## 2022-09-28 VITALS
DIASTOLIC BLOOD PRESSURE: 62 MMHG | WEIGHT: 211 LBS | HEART RATE: 70 BPM | OXYGEN SATURATION: 99 % | BODY MASS INDEX: 30.21 KG/M2 | HEIGHT: 70 IN | SYSTOLIC BLOOD PRESSURE: 168 MMHG

## 2022-09-28 DIAGNOSIS — Z95.2 H/O PULMONIC VALVE REPLACEMENT: Primary | ICD-10-CM

## 2022-09-28 DIAGNOSIS — I35.1 NONRHEUMATIC AORTIC VALVE INSUFFICIENCY: ICD-10-CM

## 2022-09-28 DIAGNOSIS — I10 ESSENTIAL HYPERTENSION: ICD-10-CM

## 2022-09-28 DIAGNOSIS — N18.4 CKD (CHRONIC KIDNEY DISEASE) STAGE 4, GFR 15-29 ML/MIN: ICD-10-CM

## 2022-09-28 DIAGNOSIS — E66.09 CLASS 1 OBESITY DUE TO EXCESS CALORIES WITH SERIOUS COMORBIDITY AND BODY MASS INDEX (BMI) OF 30.0 TO 30.9 IN ADULT: ICD-10-CM

## 2022-09-28 DIAGNOSIS — E78.5 DYSLIPIDEMIA: ICD-10-CM

## 2022-09-28 PROBLEM — E66.3 OVERWEIGHT WITH BODY MASS INDEX (BMI) OF 29 TO 29.9 IN ADULT: Status: RESOLVED | Noted: 2020-12-30 | Resolved: 2022-09-28

## 2022-09-28 PROCEDURE — 99214 OFFICE O/P EST MOD 30 MIN: CPT | Performed by: INTERNAL MEDICINE

## 2022-09-28 RX ORDER — CARVEDILOL 12.5 MG/1
12.5 TABLET ORAL 2 TIMES DAILY
COMMUNITY
Start: 2022-08-15

## 2022-09-28 RX ORDER — NIFEDIPINE 90 MG/1
90 TABLET, EXTENDED RELEASE ORAL DAILY
COMMUNITY
Start: 2022-09-09

## 2022-09-28 RX ORDER — HYDRALAZINE HYDROCHLORIDE 25 MG/1
25 TABLET, FILM COATED ORAL 3 TIMES DAILY
Qty: 90 TABLET | Refills: 3 | Status: SHIPPED | OUTPATIENT
Start: 2022-09-28

## 2022-09-28 RX ORDER — BUPROPION HYDROCHLORIDE 300 MG/1
300 TABLET ORAL DAILY
COMMUNITY
Start: 2022-09-20

## 2022-09-28 NOTE — PROGRESS NOTES
Reason for Visit: cardiovascular follow up.    HPI:  Nevaeh Atkinson is a 32 y.o. female is here today for 1 year follow-up.  She follows in pulmonology clinic due to history of pulmonic valve replacement.  He was recently admitted to [facility] with high blood pressure.  Repeat echo was done and reported moderate to severe AI.  The pulmonic valve was unable to be well visualized.  Ultrasound reported 50 to 69% stenosis bilaterally.  She reports having a renal artery ultrasound at Dr. Rosen's office that she thinks was inconclusive.  Results of this is not available today.  BMP from 9/23/2022 showed worsening kidney function.  She is planning to call and set up another follow-up appointment with her nephrologist.    Previous Cardiac Testing and Procedures:  -Echo (05/25/2016) EF 60%, grade 2 diastolic filling pattern, mild aortic stenosis with mean gradient 14 mmHg, EB 1.76 cm², mild AI, mild MR, bioprosthetic pulmonary valve with peak gradient 15 mmHg, RVSP 35 mmHg  -Echo (06/29/2017) EF 65%, normal diastolic function, mild aortic stenosis with mean gradient 6 mmHg, mild aortic regurgitation, mild pulmonic regurgitation, bioprosthetic pulmonic valve with peak gradient 9 mmHg, mean gradient 5 mmHg, mild TR  -Echo (8/6/2019) EF 65-70%, mild AI, pulmonic valve not well visualized, trivial PI, peak pulmonic gradient 6 mmHg, mean gradient 3 mmHg  -Lipid panel (9/3/2020) total cholesterol 244, HDL 49, , triglycerides 169  -BMP (9/3/2020) creatinine 2.07, GFR 31, BUN 43, potassium 4.7, sodium 139  -Lipid panel (8/7/2022) total cholesterol 246, HDL 44, , triglycerides 255  -Echo (8/8/2022) EF 60%, moderate to severe AI, structurally normal AV, mild MR, pulmonic valve not well visualized.    -Carotid US (8/8/2022) 50-69% stenosis b/l  -BMP (9/23/2022) creatinine 2.78, GFR 21, potassium 4.6, sodium 137      Patient Active Problem List   Diagnosis   • H/O pulmonic valve replacement   • Peptic ulcer disease    • Anxiety   • Eclampsia, delivered   • CKD (chronic kidney disease) stage 4, GFR 15-29 ml/min ([facility])   • Essential hypertension   • Aortic valve regurgitation   • Disc displacement, lumbar   • Scoliosis of lumbar spine   • Non-smoker   • Dyslipidemia   • Class 1 obesity due to excess calories with serious comorbidity and body mass index (BMI) of 30.0 to 30.9 in adult       Social History     Tobacco Use   • Smoking status: Never Smoker   • Smokeless tobacco: Never Used   Vaping Use   • Vaping Use: Never used   Substance Use Topics   • Alcohol use: No   • Drug use: No       Family History   Adopted: Yes       The following portions of the patient's history were reviewed and updated as appropriate: allergies, current medications, past family history, past medical history, past social history, past surgical history and problem list.      Current Outpatient Medications:   •  carvedilol (COREG) 12.5 MG tablet, Take 12.5 mg by mouth 2 (Two) Times a Day., Disp: , Rfl:   •  irbesartan (AVAPRO) 300 MG tablet, Take 300 mg by mouth Every Morning., Disp: , Rfl: 0  •  loratadine (CLARITIN) 10 MG tablet, Take 10 mg by mouth Daily., Disp: , Rfl:   •  NIFEdipine XL (PROCARDIA XL) 90 MG 24 hr tablet, Take 90 mg by mouth Daily., Disp: , Rfl:   •  traZODone (DESYREL) 100 MG tablet, Take 100 mg by mouth every night at bedtime., Disp: , Rfl:   •  buPROPion XL (WELLBUTRIN XL) 300 MG 24 hr tablet, Take 300 mg by mouth Daily., Disp: , Rfl:   •  furosemide (LASIX) 40 MG tablet, Take 40 mg by mouth As Needed., Disp: , Rfl: 0  •  hydrALAZINE (APRESOLINE) 25 MG tablet, Take 1 tablet by mouth 3 (Three) Times a Day., Disp: 90 tablet, Rfl: 3    Review of Systems   Constitutional: Negative for chills and fever.   Cardiovascular: Negative for chest pain and paroxysmal nocturnal dyspnea.   Respiratory: Negative for cough and shortness of breath.    Skin: Negative for rash.   Gastrointestinal: Negative for abdominal pain and heartburn.  "  Neurological: Negative for dizziness and numbness.       Objective   /62 (BP Location: Left arm, Patient Position: Sitting, Cuff Size: Adult)   Pulse 70   Ht 177.8 cm (70\")   Wt 95.7 kg (211 lb)   SpO2 99%   BMI 30.28 kg/m²   Constitutional:       Appearance: Well-developed.   HENT:      Head: Normocephalic and atraumatic.   Pulmonary:      Effort: Pulmonary effort is normal.      Breath sounds: Normal breath sounds.   Cardiovascular:      Normal rate. Regular rhythm.      Murmurs: There is a grade 2/6 holosystolic murmur.      No gallop.   Skin:     General: Skin is warm and dry.   Neurological:      Mental Status: Alert and oriented to person, place, and time.       Procedures      ICD-10-CM ICD-9-CM   1. H/O pulmonic valve replacement  Z95.2 V43.3   2. Nonrheumatic aortic valve insufficiency  I35.1 424.1   3. Essential hypertension  I10 401.9   4. Dyslipidemia  E78.5 272.4   5. Class 1 obesity due to excess calories with serious comorbidity and body mass index (BMI) of 30.0 to 30.9 in adult  E66.09 278.00    Z68.30 V85.30   6. CKD (chronic kidney disease) stage 4, GFR 15-29 ml/min ([facility])  N18.4 585.4         Assessment/Plan:  1.  Pulmonic valve replacement: Echo from 8/6/2019 demonstrated stable gradient with only trivial PI.  Echo at [facility] from 8/8/2022 with not able to visualize the pulmonic valve well.  Order repeat echo in 6 months to a year.     2. Aortic regurgitation: Mild  AI on echo from 8/2019 with no evidence of stenosis.  Echo from [facility] on 8/8/2022 reported moderate to severe AI with a structurally normal file.  LV size and function was normal.  It would not be an indication to repair this at this time.  Plan to order repeat echo in 6 months to a year.     3.  Essential hypertension:  Systolic blood pressure is elevated today.  Start hydralazine 25 mg TID.  Continue other medical therapy.       4.  Dyslipidemia: Lipid panel from 8/7/2022 remains elevated.     5.  Obese: BMI " is >= 30 and <35. (Class 1 Obesity). The following options were offered after discussion;: exercise counseling/recommendations and nutrition counseling/recommendations.    6.  CKD stage IV: Worsening renal function.  Encouraged her to follow-up with her nephrologist.  Obtain copy of recent renal artery ultrasound from Dr. Coello's office.

## 2022-11-30 ENCOUNTER — OFFICE VISIT (OUTPATIENT)
Dept: CARDIOLOGY | Facility: CLINIC | Age: 32
End: 2022-11-30

## 2022-11-30 VITALS
WEIGHT: 215.4 LBS | DIASTOLIC BLOOD PRESSURE: 60 MMHG | HEART RATE: 89 BPM | HEIGHT: 70 IN | SYSTOLIC BLOOD PRESSURE: 154 MMHG | BODY MASS INDEX: 30.84 KG/M2 | OXYGEN SATURATION: 99 %

## 2022-11-30 DIAGNOSIS — N18.4 CKD (CHRONIC KIDNEY DISEASE) STAGE 4, GFR 15-29 ML/MIN: ICD-10-CM

## 2022-11-30 DIAGNOSIS — I35.1 NONRHEUMATIC AORTIC VALVE INSUFFICIENCY: ICD-10-CM

## 2022-11-30 DIAGNOSIS — E66.09 CLASS 1 OBESITY DUE TO EXCESS CALORIES WITH SERIOUS COMORBIDITY AND BODY MASS INDEX (BMI) OF 30.0 TO 30.9 IN ADULT: ICD-10-CM

## 2022-11-30 DIAGNOSIS — E78.5 DYSLIPIDEMIA: ICD-10-CM

## 2022-11-30 DIAGNOSIS — Z95.2 H/O PULMONIC VALVE REPLACEMENT: Primary | ICD-10-CM

## 2022-11-30 DIAGNOSIS — I10 ESSENTIAL HYPERTENSION: ICD-10-CM

## 2022-11-30 PROCEDURE — 99214 OFFICE O/P EST MOD 30 MIN: CPT | Performed by: INTERNAL MEDICINE

## 2022-11-30 RX ORDER — FERROUS SULFATE TAB EC 324 MG (65 MG FE EQUIVALENT) 324 (65 FE) MG
324 TABLET DELAYED RESPONSE ORAL
COMMUNITY

## 2022-11-30 NOTE — PROGRESS NOTES
Reason for Visit: cardiovascular follow up.    HPI:  Nevaeh Atkinson is a 32 y.o. female is here today for follow-up.  She reports her blood pressure has been up and down.  There have been a couple of instances where her blood pressure is low and she felt dizzy.  More often than not it has been a little elevated.  She denies any chest pain, syncope, PND, or orthopnea.  Every once in a while she will notice a skipped beat.  She will notice some sore muscles at the end of the day sometimes.  Her next nephrology appointment is in January.      Previous Cardiac Testing and Procedures:  -Echo (05/25/2016) EF 60%, grade 2 diastolic filling pattern, mild aortic stenosis with mean gradient 14 mmHg, EB 1.76 cm², mild AI, mild MR, bioprosthetic pulmonary valve with peak gradient 15 mmHg, RVSP 35 mmHg  -Echo (06/29/2017) EF 65%, normal diastolic function, mild aortic stenosis with mean gradient 6 mmHg, mild aortic regurgitation, mild pulmonic regurgitation, bioprosthetic pulmonic valve with peak gradient 9 mmHg, mean gradient 5 mmHg, mild TR  -Echo (8/6/2019) EF 65-70%, mild AI, pulmonic valve not well visualized, trivial PI, peak pulmonic gradient 6 mmHg, mean gradient 3 mmHg  -Lipid panel (9/3/2020) total cholesterol 244, HDL 49, , triglycerides 169  -BMP (9/3/2020) creatinine 2.07, GFR 31, BUN 43, potassium 4.7, sodium 139  -Lipid panel (8/7/2022) total cholesterol 246, HDL 44, , triglycerides 255  -Echo (8/8/2022) EF 60%, moderate to severe AI, structurally normal AV, mild MR, pulmonic valve not well visualized.    -Carotid US (8/8/2022) 50-69% stenosis b/l  -Renal artery ultrasound (9/2/2022) significantly technically limited exam with left kidney not well visualized, no definitive right renal artery stenosis, right renal artery not well visualized  -Venous duplex right lower extremity (9/6/2022) normal duplex of right lower extremity with no evidence of DVT  -BMP (9/23/2022) creatinine 2.78, GFR 21,  potassium 4.6, sodium 137    Patient Active Problem List   Diagnosis   • H/O pulmonic valve replacement   • Peptic ulcer disease   • Anxiety   • Eclampsia, delivered   • CKD (chronic kidney disease) stage 4, GFR 15-29 ml/min ([facility]   • Essential hypertension   • Aortic valve regurgitation   • Disc displacement, lumbar   • Scoliosis of lumbar spine   • Non-smoker   • Dyslipidemia   • Class 1 obesity due to excess calories with serious comorbidity and body mass index (BMI) of 30.0 to 30.9 in adult       Social History     Tobacco Use   • Smoking status: Never   • Smokeless tobacco: Never   Vaping Use   • Vaping Use: Never used   Substance Use Topics   • Alcohol use: No   • Drug use: No       Family History   Adopted: Yes       The following portions of the patient's history were reviewed and updated as appropriate: allergies, current medications, past family history, past medical history, past social history, past surgical history and problem list.      Current Outpatient Medications:   •  buPROPion XL (WELLBUTRIN XL) 300 MG 24 hr tablet, Take 300 mg by mouth Daily., Disp: , Rfl:   •  ferrous sulfate 324 (65 Fe) MG tablet delayed-release EC tablet, Take 324 mg by mouth Daily With Breakfast., Disp: , Rfl:   •  hydrALAZINE (APRESOLINE) 25 MG tablet, Take 1 tablet by mouth 3 (Three) Times a Day., Disp: 90 tablet, Rfl: 3  •  irbesartan (AVAPRO) 300 MG tablet, Take 300 mg by mouth Every Morning., Disp: , Rfl: 0  •  loratadine (CLARITIN) 10 MG tablet, Take 10 mg by mouth Daily., Disp: , Rfl:   •  NIFEdipine XL (PROCARDIA XL) 90 MG 24 hr tablet, Take 90 mg by mouth Daily., Disp: , Rfl:   •  traZODone (DESYREL) 100 MG tablet, Take 100 mg by mouth every night at bedtime., Disp: , Rfl:   •  carvedilol (COREG) 12.5 MG tablet, Take 12.5 mg by mouth 2 (Two) Times a Day., Disp: , Rfl:   •  furosemide (LASIX) 40 MG tablet, Take 40 mg by mouth As Needed., Disp: , Rfl: 0    Review of Systems   Constitutional: Negative for chills and  "fever.   Cardiovascular: Positive for irregular heartbeat. Negative for chest pain and paroxysmal nocturnal dyspnea.   Respiratory: Negative for cough and shortness of breath.    Skin: Negative for rash.   Musculoskeletal: Positive for muscle cramps.   Gastrointestinal: Negative for abdominal pain and heartburn.   Neurological: Negative for dizziness and numbness.       Objective   /60 (BP Location: Left arm, Patient Position: Sitting, Cuff Size: Adult)   Pulse 89   Ht 177.8 cm (70\")   Wt 97.7 kg (215 lb 6.4 oz)   SpO2 99%   BMI 30.91 kg/m²   Constitutional:       Appearance: Well-developed.   HENT:      Head: Normocephalic and atraumatic.   Pulmonary:      Effort: Pulmonary effort is normal.      Breath sounds: Normal breath sounds.   Cardiovascular:      Normal rate. Regular rhythm.      Murmurs: There is a grade 2/6 holosystolic murmur.   Skin:     General: Skin is warm and dry.   Neurological:      Mental Status: Alert and oriented to person, place, and time.       Procedures      ICD-10-CM ICD-9-CM   1. H/O pulmonic valve replacement  Z95.2 V43.3   2. Nonrheumatic aortic valve insufficiency  I35.1 424.1   3. Essential hypertension  I10 401.9   4. Dyslipidemia  E78.5 272.4   5. Class 1 obesity due to excess calories with serious comorbidity and body mass index (BMI) of 30.0 to 30.9 in adult  E66.09 278.00    Z68.30 V85.30   6. CKD (chronic kidney disease) stage 4, GFR 15-29 ml/min (HCC)  N18.4 585.4         Assessment/Plan:  1. Pulmonic valve replacement: Echo from 8/6/2019 demonstrated stable gradient with only trivial PI.  Echo at [facility] from 8/8/2022 with not able to visualize the pulmonic valve well.  Plan to order repeat echo at follow-up.     2. Aortic regurgitation: Mild on echo from 8/2019 with no evidence of stenosis, but reported to be moderate to severe on repeat echo from 8/8/2022 at [facility].  Plan to order repeat echo at 6-month follow-up.     3.  Essential hypertension: " Elevated systolic blood pressure today with low normal diastolic blood pressure.  Significant fluctuations at home with intermittent low blood pressures.  Avoid any titration at this time and continue carvedilol, hydralazine, irbesartan, and nifedipine.     4.  Dyslipidemia: Elevated total and LDL cholesterol along with high triglycerides and low HDL on last lipid panel from 8/7/2022.  Managed on lifestyle modification.     5.  Obese: BMI is >= 30 and <35. (Class 1 Obesity). The following options were offered after discussion;: exercise counseling/recommendations and nutrition counseling/recommendations.      6.  CKD stage IV: Has follow-up in her nephrology clinic in January.

## 2023-05-15 ENCOUNTER — TELEPHONE (OUTPATIENT)
Dept: NEUROSURGERY | Facility: CLINIC | Age: 33
End: 2023-05-15

## 2023-05-15 NOTE — TELEPHONE ENCOUNTER
Caller: Nevaeh Atkinson    Relationship to patient: Self    Best call back number: [phone]    Chief complaint: BACK PAIN    Type of visit: FOLLOW UP EXTENDED    Requested date: ASAP    If rescheduling, when is the original appointment: NA    Additional notes:PT CALLED AND STATES THAT SHE IS HAVING BACK PAIN AGAIN-PT STATES THAT SHE WAS  HURT AT WORK-PT STATES SHE HAS THE SAME BACK PAIN AS BEFORE-PT HAS FILED FOR WC BUT HAS NOT HEARD IF IT WILL BE APPROVED YET-PT STATES SHE COMPLETED A NEW MRI 05/08/23 AT [facility]-PT IS ASKING IF SHE CAN MAKE AN APPT. SENDING TO OFFICE TO ADVISE AS THIS MAY BE WC-PT WOULD LIKE A CALL BACK TO ADVISE THANK YOU

## 2023-05-16 NOTE — TELEPHONE ENCOUNTER
I talked with the patient and she still doesn't have approval from her work comp.  She is going to get in touch with them today to see where they are in the process.  I explained to see her under work comp we would have to have the approval information; otherwise, we would have to file it to her private insurance which may deny with an open work comp claim.  She expressed understanding and stated she would call us back when she knew about the work comp.    TREVOR BARTH [facility]  PHYSICIAN LEAD  DR MARV BENITEZ  [facility] [place] [facility]

## 2023-05-16 NOTE — TELEPHONE ENCOUNTER
If patient is wanting to be seen and the visit be filed under work comp she will need to wait until she has that approved.  We will need that in writing.  Once this information has been obtained she can be scheduled with Eddie for initial evaluation.      TREVOR BARTH [facility]  PHYSICIAN LEAD  DR MARV BENITEZ  [facility] NEUROSURGERY      IT IS OKAY FOR THE HUB TO DELIVER THIS INFORMATION TO THE PATIENT IF THEY RECEIVE THIS CALL BACK

## 2023-05-30 NOTE — PROGRESS NOTES
Reason for Visit: cardiovascular follow up.    HPI:  Nevaeh Atkinson is a 33 y.o. female is here today for 6-month follow-up.  She follows in cardiology clinic secondary to pulmonic valve replacement and aortic regurgitation.  Last echo from 8/8/2022 at [facility] reported normal LV systolic function with EF of 60%, moderate to severe aortic insufficiency, structurally normal aortic valve, mild MR, and the pulmonic valve is not well visualized.  She has been eating healthy and losing weight.  She is also exercising regularly.  Her weight is down 51 lbs compared to last visit.  She has been able to stop all antihypertensive medications after losing weight.   Her blood pressure is well controlled.      Previous Cardiac Testing and Procedures:  -Echo (05/25/2016) EF 60%, grade 2 diastolic filling pattern, mild aortic stenosis with mean gradient 14 mmHg, EB 1.76 cm², mild AI, mild MR, bioprosthetic pulmonary valve with peak gradient 15 mmHg, RVSP 35 mmHg  -Echo (06/29/2017) EF 65%, normal diastolic function, mild aortic stenosis with mean gradient 6 mmHg, mild aortic regurgitation, mild pulmonic regurgitation, bioprosthetic pulmonic valve with peak gradient 9 mmHg, mean gradient 5 mmHg, mild TR  -Echo (8/6/2019) EF 65-70%, mild AI, pulmonic valve not well visualized, trivial PI, peak pulmonic gradient 6 mmHg, mean gradient 3 mmHg  -Echo (8/8/2022) EF 60%, moderate to severe AI, structurally normal AV, mild MR, pulmonic valve not well visualized.    -Carotid US (8/8/2022) 50-69% stenosis b/l  -Renal artery ultrasound (9/2/2022) significantly technically limited exam with left kidney not well visualized, no definitive right renal artery stenosis, right renal artery not well visualized  -Venous duplex right lower extremity (9/6/2022) normal duplex of right lower extremity with no evidence of DVT      Lab data:  -Lipid panel (9/3/2020) total cholesterol 244, HDL 49, , triglycerides 169  -BMP (9/3/2020) creatinine  "2.07, GFR 31, BUN 43, potassium 4.7, sodium 139  -Lipid panel (8/7/2022) total cholesterol 246, HDL 44, , triglycerides 255  -BMP (9/23/2022) creatinine 2.78, GFR 21, potassium 4.6, sodium 137    Patient Active Problem List   Diagnosis    H/O pulmonic valve replacement    Peptic ulcer disease    Anxiety    Eclampsia, delivered    CKD (chronic kidney disease) stage 4, GFR 15-29 ml/min ([facility])    Essential hypertension    Aortic valve regurgitation    Disc displacement, lumbar    Scoliosis of lumbar spine    Non-smoker    Dyslipidemia    Class 1 obesity due to excess calories with serious comorbidity and body mass index (BMI) of 30.0 to 30.9 in adult       Social History     Tobacco Use    Smoking status: Never    Smokeless tobacco: Never   Vaping Use    Vaping Use: Never used   Substance Use Topics    Alcohol use: No    Drug use: No       Family History   Adopted: Yes       The following portions of the patient's history were reviewed and updated as appropriate: allergies, current medications, past family history, past medical history, past social history, past surgical history, and problem list.      Current Outpatient Medications:     buPROPion XL (WELLBUTRIN XL) 300 MG 24 hr tablet, Take 1 tablet by mouth Daily., Disp: , Rfl:     loratadine (CLARITIN) 10 MG tablet, Take 1 tablet by mouth Daily., Disp: , Rfl:     traZODone (DESYREL) 100 MG tablet, Take 1 tablet by mouth every night at bedtime., Disp: , Rfl:     Review of Systems   Constitutional: Negative for chills and fever.   Cardiovascular:  Negative for chest pain and paroxysmal nocturnal dyspnea.   Respiratory:  Negative for cough and shortness of breath.    Skin:  Negative for rash.   Gastrointestinal:  Negative for abdominal pain and heartburn.   Neurological:  Negative for dizziness and numbness.     Objective   /40 (BP Location: Left arm, Patient Position: Sitting, Cuff Size: Adult)   Pulse 77   Ht 177.8 cm (70\")   Wt 78.9 kg (174 lb)   " SpO2 97%   BMI 24.97 kg/m²   Constitutional:       Appearance: Well-developed.   HENT:      Head: Normocephalic and atraumatic.   Pulmonary:      Effort: Pulmonary effort is normal.      Breath sounds: Normal breath sounds.   Cardiovascular:      Normal rate. Regular rhythm.      Murmurs: There is no murmur.      No gallop.  No click.   Edema:     Peripheral edema absent.   Skin:     General: Skin is warm and dry.   Neurological:      Mental Status: Alert and oriented to person, place, and time.       ECG 12 Lead    Date/Time: 5/31/2023 9:57 AM  Performed by: Severiano Gamino MD  Authorized by: Severiano Gamino MD   Comparison: compared with previous ECG from 9/29/2021  Similar to previous ECG  Rhythm: sinus rhythm  Rate: normal  Other findings: non-specific ST-T wave changes and left ventricular hypertrophy          ICD-10-CM ICD-9-CM   1. H/O pulmonic valve replacement  Z95.2 V43.3   2. Nonrheumatic aortic valve insufficiency  I35.1 424.1   3. Essential hypertension  I10 401.9   4. CKD (chronic kidney disease) stage 4, GFR 15-29 ml/min (HCC)  N18.4 585.4         Assessment/Plan:  1.  Pulmonic valve replacement: Echo from 8/6/2019 demonstrated stable gradient with only trivial PI.  Echo at [facility] from 8/8/2022 with not able to visualize the pulmonic valve well.  Order repeat echo to be done prior to 6-month follow-up.     2. Aortic regurgitation: Mild on echo from 8/2019 with no evidence of stenosis, but reported to be moderate to severe on repeat echo from 8/8/2022 at [facility].  Order repeat echo to be done prior to 6-month follow-up.     3.  Essential hypertension: Systolic blood pressure remains elevated today with low diastolic pressure.  Blood pressure is well controlled at home.  She is able to get off all her antihypertensive medications following weight loss.     4.  CKD stage IV: Follows with nephrology.

## 2023-05-31 ENCOUNTER — OFFICE VISIT (OUTPATIENT)
Dept: CARDIOLOGY | Facility: CLINIC | Age: 33
End: 2023-05-31

## 2023-05-31 VITALS
DIASTOLIC BLOOD PRESSURE: 40 MMHG | HEART RATE: 77 BPM | OXYGEN SATURATION: 97 % | WEIGHT: 174 LBS | HEIGHT: 70 IN | BODY MASS INDEX: 24.91 KG/M2 | SYSTOLIC BLOOD PRESSURE: 154 MMHG

## 2023-05-31 DIAGNOSIS — I35.1 NONRHEUMATIC AORTIC VALVE INSUFFICIENCY: ICD-10-CM

## 2023-05-31 DIAGNOSIS — N18.4 CKD (CHRONIC KIDNEY DISEASE) STAGE 4, GFR 15-29 ML/MIN: ICD-10-CM

## 2023-05-31 DIAGNOSIS — Z95.2 H/O PULMONIC VALVE REPLACEMENT: Primary | ICD-10-CM

## 2023-05-31 DIAGNOSIS — I10 ESSENTIAL HYPERTENSION: ICD-10-CM

## 2023-05-31 NOTE — LETTER
May 31, 2023     CRISTIANE Ellsworth  [address] [address]    Patient: Nevaeh Atkinson   YOB: 1990   Date of Visit: 5/31/2023       Dear CRISTIANE Ellsworth    Nevaeh Atkinson was in my office today. Below is a copy of my note.    If you have questions, please do not hesitate to call me. I look forward to following Nevaeh along with you.         Sincerely,        Severiano Gamino MD        CC: No Recipients      Reason for Visit: cardiovascular follow up.    HPI:  Nevaeh Atkinson is a 33 y.o. female is here today for 6-month follow-up.  She follows in cardiology clinic secondary to pulmonic valve replacement and aortic regurgitation.  Last echo from 8/8/2022 at [facility] reported normal LV systolic function with EF of 60%, moderate to severe aortic insufficiency, structurally normal aortic valve, mild MR, and the pulmonic valve is not well visualized.  She has been eating healthy and losing weight.  She is also exercising regularly.  Her weight is down 51 lbs compared to last visit.  She has been able to stop all antihypertensive medications after losing weight.   Her blood pressure is well controlled.      Previous Cardiac Testing and Procedures:  -Echo (05/25/2016) EF 60%, grade 2 diastolic filling pattern, mild aortic stenosis with mean gradient 14 mmHg, EB 1.76 cm², mild AI, mild MR, bioprosthetic pulmonary valve with peak gradient 15 mmHg, RVSP 35 mmHg  -Echo (06/29/2017) EF 65%, normal diastolic function, mild aortic stenosis with mean gradient 6 mmHg, mild aortic regurgitation, mild pulmonic regurgitation, bioprosthetic pulmonic valve with peak gradient 9 mmHg, mean gradient 5 mmHg, mild TR  -Echo (8/6/2019) EF 65-70%, mild AI, pulmonic valve not well visualized, trivial PI, peak pulmonic gradient 6 mmHg, mean gradient 3 mmHg  -Echo (8/8/2022) EF 60%, moderate to severe AI, structurally normal AV, mild MR, pulmonic valve not well visualized.     -Carotid US (8/8/2022) 50-69% stenosis b/l  -Renal artery ultrasound (9/2/2022) significantly technically limited exam with left kidney not well visualized, no definitive right renal artery stenosis, right renal artery not well visualized  -Venous duplex right lower extremity (9/6/2022) normal duplex of right lower extremity with no evidence of DVT      Lab data:  -Lipid panel (9/3/2020) total cholesterol 244, HDL 49, , triglycerides 169  -BMP (9/3/2020) creatinine 2.07, GFR 31, BUN 43, potassium 4.7, sodium 139  -Lipid panel (8/7/2022) total cholesterol 246, HDL 44, , triglycerides 255  -BMP (9/23/2022) creatinine 2.78, GFR 21, potassium 4.6, sodium 137    Patient Active Problem List   Diagnosis   • H/O pulmonic valve replacement   • Peptic ulcer disease   • Anxiety   • Eclampsia, delivered   • CKD (chronic kidney disease) stage 4, GFR 15-29 ml/min ([facility])   • Essential hypertension   • Aortic valve regurgitation   • Disc displacement, lumbar   • Scoliosis of lumbar spine   • Non-smoker   • Dyslipidemia   • Class 1 obesity due to excess calories with serious comorbidity and body mass index (BMI) of 30.0 to 30.9 in adult       Social History     Tobacco Use   • Smoking status: Never   • Smokeless tobacco: Never   Vaping Use   • Vaping Use: Never used   Substance Use Topics   • Alcohol use: No   • Drug use: No       Family History   Adopted: Yes       The following portions of the patient's history were reviewed and updated as appropriate: allergies, current medications, past family history, past medical history, past social history, past surgical history, and problem list.      Current Outpatient Medications:   •  buPROPion XL (WELLBUTRIN XL) 300 MG 24 hr tablet, Take 1 tablet by mouth Daily., Disp: , Rfl:   •  loratadine (CLARITIN) 10 MG tablet, Take 1 tablet by mouth Daily., Disp: , Rfl:   •  traZODone (DESYREL) 100 MG tablet, Take 1 tablet by mouth every night at bedtime., Disp: , Rfl:     Review of  "Systems   Constitutional: Negative for chills and fever.   Cardiovascular:  Negative for chest pain and paroxysmal nocturnal dyspnea.   Respiratory:  Negative for cough and shortness of breath.    Skin:  Negative for rash.   Gastrointestinal:  Negative for abdominal pain and heartburn.   Neurological:  Negative for dizziness and numbness.     Objective  /40 (BP Location: Left arm, Patient Position: Sitting, Cuff Size: Adult)   Pulse 77   Ht 177.8 cm (70\")   Wt 78.9 kg (174 lb)   SpO2 97%   BMI 24.97 kg/m²   Constitutional:       Appearance: Well-developed.   HENT:      Head: Normocephalic and atraumatic.   Pulmonary:      Effort: Pulmonary effort is normal.      Breath sounds: Normal breath sounds.   Cardiovascular:      Normal rate. Regular rhythm.      Murmurs: There is no murmur.      No gallop.  No click.   Edema:     Peripheral edema absent.   Skin:     General: Skin is warm and dry.   Neurological:      Mental Status: Alert and oriented to person, place, and time.       ECG 12 Lead    Date/Time: 5/31/2023 9:57 AM  Performed by: Severiano Gamino MD  Authorized by: Severiano Gamino MD   Comparison: compared with previous ECG from 9/29/2021  Similar to previous ECG  Rhythm: sinus rhythm  Rate: normal  Other findings: non-specific ST-T wave changes and left ventricular hypertrophy          ICD-10-CM ICD-9-CM   1. H/O pulmonic valve replacement  Z95.2 V43.3   2. Nonrheumatic aortic valve insufficiency  I35.1 424.1   3. Essential hypertension  I10 401.9   4. CKD (chronic kidney disease) stage 4, GFR 15-29 ml/min ([facility])  N18.4 585.4         Assessment/Plan:  1.  Pulmonic valve replacement: Echo from 8/6/2019 demonstrated stable gradient with only trivial PI.  Echo at [facility] from 8/8/2022 with not able to visualize the pulmonic valve well.  Order repeat echo to be done prior to 6-month follow-up.     2. Aortic regurgitation: Mild on echo from 8/2019 with no evidence of stenosis, but reported to be " moderate to severe on repeat echo from 8/8/2022 at [facility].  Order repeat echo to be done prior to 6-month follow-up.     3.  Essential hypertension: Systolic blood pressure remains elevated today with low diastolic pressure.  Blood pressure is well controlled at home.  She is able to get off all her antihypertensive medications following weight loss.     4.  CKD stage IV: Follows with nephrology.

## 2023-06-16 NOTE — TELEPHONE ENCOUNTER
WORK COMP SENT IN A REQUEST FOR RECORDS FOR PREVIOUS OFFICE VISIT NOTES SO I HAVE UPLOADED THE REQUEST AND ROUTED IT TO THE [facility] DEPARTMENT.     NO AUTH FOR PATIENT TO BE SEEN AS OF YET.

## 2023-11-07 DIAGNOSIS — Z95.2 H/O PULMONIC VALVE REPLACEMENT: ICD-10-CM

## 2023-11-07 DIAGNOSIS — I35.1 NONRHEUMATIC AORTIC VALVE INSUFFICIENCY: ICD-10-CM

## 2023-11-13 ENCOUNTER — TELEPHONE (OUTPATIENT)
Dept: CARDIOLOGY | Facility: CLINIC | Age: 33
End: 2023-11-13
Payer: COMMERCIAL

## 2023-11-13 NOTE — TELEPHONE ENCOUNTER
----- Message from Severiano Gamino MD sent at 11/8/2023  5:36 PM CST -----  Please let her know that the echo showed improvement in the aortic valve regurgitation (leaking of the aortic valve) compared to prior echo in 2022.  Her pulmonic valve appears to be functioning normally.  We can discuss this further at her upcoming follow-up appointment in January.

## 2024-03-15 ENCOUNTER — APPOINTMENT (OUTPATIENT)
Dept: GENERAL RADIOLOGY | Age: 34
End: 2024-03-15
Payer: COMMERCIAL

## 2024-03-15 LAB
ALBUMIN SERPL-MCNC: 4 G/DL (ref 3.5–5.2)
ALP SERPL-CCNC: 107 U/L (ref 35–104)
ALT SERPL-CCNC: 52 U/L (ref 5–33)
ANION GAP SERPL CALCULATED.3IONS-SCNC: 11 MMOL/L (ref 7–19)
AST SERPL-CCNC: 31 U/L (ref 5–32)
BILIRUB SERPL-MCNC: 0.3 MG/DL (ref 0.2–1.2)
BUN SERPL-MCNC: 44 MG/DL (ref 6–20)
CALCIUM SERPL-MCNC: 9.3 MG/DL (ref 8.6–10)
CHLORIDE SERPL-SCNC: 102 MMOL/L (ref 98–111)
CO2 SERPL-SCNC: 23 MMOL/L (ref 22–29)
CREAT SERPL-MCNC: 2.5 MG/DL (ref 0.5–0.9)
GLUCOSE SERPL-MCNC: 99 MG/DL (ref 74–109)
HCG SERPL QL: NEGATIVE
POTASSIUM SERPL-SCNC: 4.4 MMOL/L (ref 3.5–5)
PROT SERPL-MCNC: 7.4 G/DL (ref 6.6–8.7)
SODIUM SERPL-SCNC: 136 MMOL/L (ref 136–145)
TROPONIN, HIGH SENSITIVITY: 14 NG/L (ref 0–14)

## 2024-03-15 PROCEDURE — 36415 COLL VENOUS BLD VENIPUNCTURE: CPT

## 2024-03-15 PROCEDURE — 80053 COMPREHEN METABOLIC PANEL: CPT

## 2024-03-15 PROCEDURE — 84484 ASSAY OF TROPONIN QUANT: CPT

## 2024-03-15 PROCEDURE — 84703 CHORIONIC GONADOTROPIN ASSAY: CPT

## 2024-03-15 PROCEDURE — 84443 ASSAY THYROID STIM HORMONE: CPT

## 2024-03-15 PROCEDURE — 83970 ASSAY OF PARATHORMONE: CPT

## 2024-03-15 PROCEDURE — 82306 VITAMIN D 25 HYDROXY: CPT

## 2024-03-15 PROCEDURE — 84550 ASSAY OF BLOOD/URIC ACID: CPT

## 2024-03-15 PROCEDURE — 96374 THER/PROPH/DIAG INJ IV PUSH: CPT

## 2024-03-15 PROCEDURE — 71045 X-RAY EXAM CHEST 1 VIEW: CPT

## 2024-03-15 PROCEDURE — 85025 COMPLETE CBC W/AUTO DIFF WBC: CPT

## 2024-03-15 PROCEDURE — 83735 ASSAY OF MAGNESIUM: CPT

## 2024-03-15 PROCEDURE — 99285 EMERGENCY DEPT VISIT HI MDM: CPT

## 2024-03-15 PROCEDURE — 93005 ELECTROCARDIOGRAM TRACING: CPT | Performed by: STUDENT IN AN ORGANIZED HEALTH CARE EDUCATION/TRAINING PROGRAM

## 2024-03-15 PROCEDURE — 82550 ASSAY OF CK (CPK): CPT

## 2024-03-15 RX ORDER — BUSPIRONE HYDROCHLORIDE 15 MG/1
15 TABLET ORAL 2 TIMES DAILY
COMMUNITY

## 2024-03-15 RX ORDER — LABETALOL 200 MG/1
400 TABLET, FILM COATED ORAL 2 TIMES DAILY
COMMUNITY
Start: 2024-03-12

## 2024-03-15 RX ORDER — ONDANSETRON 4 MG/1
4 TABLET, ORALLY DISINTEGRATING ORAL EVERY 8 HOURS PRN
Status: ON HOLD | COMMUNITY
End: 2024-03-17

## 2024-03-15 ASSESSMENT — PAIN - FUNCTIONAL ASSESSMENT: PAIN_FUNCTIONAL_ASSESSMENT: 0-10

## 2024-03-15 ASSESSMENT — PAIN DESCRIPTION - LOCATION: LOCATION: HEAD

## 2024-03-15 ASSESSMENT — PAIN SCALES - GENERAL: PAINLEVEL_OUTOF10: 7

## 2024-03-16 ENCOUNTER — HOSPITAL ENCOUNTER (OUTPATIENT)
Age: 34
Setting detail: OBSERVATION
LOS: 1 days | Discharge: HOME | End: 2024-03-17
Attending: STUDENT IN AN ORGANIZED HEALTH CARE EDUCATION/TRAINING PROGRAM
Payer: COMMERCIAL

## 2024-03-16 ENCOUNTER — APPOINTMENT (OUTPATIENT)
Dept: CT IMAGING | Age: 34
End: 2024-03-16
Payer: COMMERCIAL

## 2024-03-16 DIAGNOSIS — I16.0 HYPERTENSIVE URGENCY: Primary | ICD-10-CM

## 2024-03-16 PROBLEM — Z86.79 H/O RENAL ARTERY STENOSIS: Status: ACTIVE | Noted: 2024-03-16

## 2024-03-16 LAB
25(OH)D3 SERPL-MCNC: 33.8 NG/ML
BASOPHILS # BLD: 0.1 K/UL (ref 0–0.2)
BASOPHILS NFR BLD: 1.2 % (ref 0–1)
CK SERPL-CCNC: 60 U/L (ref 26–192)
CREAT UR-MCNC: 62 MG/DL (ref 28–217)
EOSINOPHIL # BLD: 0.3 K/UL (ref 0–0.6)
EOSINOPHIL NFR BLD: 3.6 % (ref 0–5)
EOSINOPHIL URNS QL MICRO: NORMAL
ERYTHROCYTE [DISTWIDTH] IN BLOOD BY AUTOMATED COUNT: 13.9 % (ref 11.5–14.5)
HCT VFR BLD AUTO: 41.2 % (ref 37–47)
HGB BLD-MCNC: 13 G/DL (ref 12–16)
IMM GRANULOCYTES # BLD: 0.1 K/UL
LYMPHOCYTES # BLD: 2.1 K/UL (ref 1.1–4.5)
LYMPHOCYTES NFR BLD: 24.7 % (ref 20–40)
MAGNESIUM SERPL-MCNC: 2.6 MG/DL (ref 1.6–2.6)
MCH RBC QN AUTO: 27.6 PG (ref 27–31)
MCHC RBC AUTO-ENTMCNC: 31.6 G/DL (ref 33–37)
MCV RBC AUTO: 87.5 FL (ref 81–99)
MONOCYTES # BLD: 0.6 K/UL (ref 0–0.9)
MONOCYTES NFR BLD: 7.6 % (ref 0–10)
NEUTROPHILS # BLD: 5.2 K/UL (ref 1.5–7.5)
NEUTS SEG NFR BLD: 62.2 % (ref 50–65)
OSMOLALITY URINE: 375 MOSM/KG (ref 250–1200)
PLATELET # BLD AUTO: 234 K/UL (ref 130–400)
PLATELET SLIDE REVIEW: ADEQUATE
PMV BLD AUTO: 10.3 FL (ref 9.4–12.3)
PTH-INTACT SERPL-MCNC: 96.7 PG/ML (ref 15–65)
RBC # BLD AUTO: 4.71 M/UL (ref 4.2–5.4)
SODIUM UR-SCNC: 80 MMOL/L
TSH SERPL DL<=0.005 MIU/L-ACNC: 4.29 UIU/ML (ref 0.27–4.2)
URATE SERPL-MCNC: 7.9 MG/DL (ref 2.4–5.7)
WBC # BLD AUTO: 8.4 K/UL (ref 4.8–10.8)

## 2024-03-16 PROCEDURE — 6360000002 HC RX W HCPCS: Performed by: STUDENT IN AN ORGANIZED HEALTH CARE EDUCATION/TRAINING PROGRAM

## 2024-03-16 PROCEDURE — 96374 THER/PROPH/DIAG INJ IV PUSH: CPT

## 2024-03-16 PROCEDURE — 82570 ASSAY OF URINE CREATININE: CPT

## 2024-03-16 PROCEDURE — G0378 HOSPITAL OBSERVATION PER HR: HCPCS

## 2024-03-16 PROCEDURE — 6370000000 HC RX 637 (ALT 250 FOR IP): Performed by: STUDENT IN AN ORGANIZED HEALTH CARE EDUCATION/TRAINING PROGRAM

## 2024-03-16 PROCEDURE — 96375 TX/PRO/DX INJ NEW DRUG ADDON: CPT

## 2024-03-16 PROCEDURE — 87205 SMEAR GRAM STAIN: CPT

## 2024-03-16 PROCEDURE — 2580000003 HC RX 258: Performed by: HOSPITALIST

## 2024-03-16 PROCEDURE — 84300 ASSAY OF URINE SODIUM: CPT

## 2024-03-16 PROCEDURE — 6360000002 HC RX W HCPCS: Performed by: HOSPITALIST

## 2024-03-16 PROCEDURE — 83935 ASSAY OF URINE OSMOLALITY: CPT

## 2024-03-16 PROCEDURE — 6370000000 HC RX 637 (ALT 250 FOR IP): Performed by: HOSPITALIST

## 2024-03-16 PROCEDURE — 70450 CT HEAD/BRAIN W/O DYE: CPT

## 2024-03-16 RX ORDER — ONDANSETRON 2 MG/ML
4 INJECTION INTRAMUSCULAR; INTRAVENOUS EVERY 6 HOURS PRN
Status: DISCONTINUED | OUTPATIENT
Start: 2024-03-16 | End: 2024-03-17 | Stop reason: HOSPADM

## 2024-03-16 RX ORDER — HYDRALAZINE HYDROCHLORIDE 50 MG/1
50 TABLET, FILM COATED ORAL EVERY 6 HOURS SCHEDULED
Status: DISCONTINUED | OUTPATIENT
Start: 2024-03-16 | End: 2024-03-16

## 2024-03-16 RX ORDER — HYDRALAZINE HYDROCHLORIDE 20 MG/ML
10 INJECTION INTRAMUSCULAR; INTRAVENOUS EVERY 4 HOURS PRN
Status: DISCONTINUED | OUTPATIENT
Start: 2024-03-16 | End: 2024-03-17 | Stop reason: HOSPADM

## 2024-03-16 RX ORDER — TIZANIDINE 4 MG/1
2 TABLET ORAL
Status: DISCONTINUED | OUTPATIENT
Start: 2024-03-16 | End: 2024-03-17 | Stop reason: HOSPADM

## 2024-03-16 RX ORDER — NIFEDIPINE 30 MG/1
90 TABLET, EXTENDED RELEASE ORAL DAILY
Status: DISCONTINUED | OUTPATIENT
Start: 2024-03-16 | End: 2024-03-17 | Stop reason: HOSPADM

## 2024-03-16 RX ORDER — LOSARTAN POTASSIUM 50 MG/1
100 TABLET ORAL NIGHTLY
Status: DISCONTINUED | OUTPATIENT
Start: 2024-03-16 | End: 2024-03-16

## 2024-03-16 RX ORDER — ENOXAPARIN SODIUM 100 MG/ML
30 INJECTION SUBCUTANEOUS DAILY
Status: DISCONTINUED | OUTPATIENT
Start: 2024-03-17 | End: 2024-03-16

## 2024-03-16 RX ORDER — MECOBALAMIN 5000 MCG
5 TABLET,DISINTEGRATING ORAL DAILY
Status: DISCONTINUED | OUTPATIENT
Start: 2024-03-16 | End: 2024-03-16

## 2024-03-16 RX ORDER — TRAMADOL HYDROCHLORIDE 50 MG/1
50 TABLET ORAL EVERY 6 HOURS PRN
Status: DISCONTINUED | OUTPATIENT
Start: 2024-03-16 | End: 2024-03-17 | Stop reason: HOSPADM

## 2024-03-16 RX ORDER — POLYETHYLENE GLYCOL 3350 17 G/17G
17 POWDER, FOR SOLUTION ORAL DAILY PRN
Status: DISCONTINUED | OUTPATIENT
Start: 2024-03-16 | End: 2024-03-17 | Stop reason: HOSPADM

## 2024-03-16 RX ORDER — ALLOPURINOL 100 MG/1
100 TABLET ORAL DAILY
Status: DISCONTINUED | OUTPATIENT
Start: 2024-03-16 | End: 2024-03-16

## 2024-03-16 RX ORDER — TIZANIDINE 2 MG/1
2 TABLET ORAL 2 TIMES DAILY PRN
COMMUNITY

## 2024-03-16 RX ORDER — MECOBALAMIN 5000 MCG
5 TABLET,DISINTEGRATING ORAL NIGHTLY PRN
Status: DISCONTINUED | OUTPATIENT
Start: 2024-03-16 | End: 2024-03-17 | Stop reason: HOSPADM

## 2024-03-16 RX ORDER — BUPROPION HYDROCHLORIDE 150 MG/1
300 TABLET ORAL EVERY MORNING
Status: DISCONTINUED | OUTPATIENT
Start: 2024-03-16 | End: 2024-03-17 | Stop reason: HOSPADM

## 2024-03-16 RX ORDER — ONDANSETRON 4 MG/1
4 TABLET, ORALLY DISINTEGRATING ORAL EVERY 8 HOURS PRN
Status: DISCONTINUED | OUTPATIENT
Start: 2024-03-16 | End: 2024-03-17 | Stop reason: HOSPADM

## 2024-03-16 RX ORDER — HYDRALAZINE HYDROCHLORIDE 50 MG/1
50 TABLET, FILM COATED ORAL 3 TIMES DAILY
Qty: 90 TABLET | Refills: 1 | Status: CANCELLED | OUTPATIENT
Start: 2024-03-16

## 2024-03-16 RX ORDER — ACETAMINOPHEN 325 MG/1
650 TABLET ORAL EVERY 6 HOURS PRN
Status: DISCONTINUED | OUTPATIENT
Start: 2024-03-16 | End: 2024-03-17 | Stop reason: HOSPADM

## 2024-03-16 RX ORDER — LOSARTAN POTASSIUM 50 MG/1
50 TABLET ORAL DAILY
Status: DISCONTINUED | OUTPATIENT
Start: 2024-03-16 | End: 2024-03-17 | Stop reason: HOSPADM

## 2024-03-16 RX ORDER — POTASSIUM CHLORIDE 7.45 MG/ML
10 INJECTION INTRAVENOUS PRN
Status: DISCONTINUED | OUTPATIENT
Start: 2024-03-16 | End: 2024-03-17 | Stop reason: HOSPADM

## 2024-03-16 RX ORDER — LABETALOL 200 MG/1
400 TABLET, FILM COATED ORAL 2 TIMES DAILY
Status: DISCONTINUED | OUTPATIENT
Start: 2024-03-17 | End: 2024-03-17 | Stop reason: HOSPADM

## 2024-03-16 RX ORDER — MAGNESIUM SULFATE IN WATER 40 MG/ML
2000 INJECTION, SOLUTION INTRAVENOUS PRN
Status: DISCONTINUED | OUTPATIENT
Start: 2024-03-16 | End: 2024-03-17 | Stop reason: HOSPADM

## 2024-03-16 RX ORDER — HYDRALAZINE HYDROCHLORIDE 50 MG/1
50 TABLET, FILM COATED ORAL EVERY 8 HOURS SCHEDULED
Status: DISCONTINUED | OUTPATIENT
Start: 2024-03-16 | End: 2024-03-17 | Stop reason: HOSPADM

## 2024-03-16 RX ORDER — SODIUM CHLORIDE 0.9 % (FLUSH) 0.9 %
5-40 SYRINGE (ML) INJECTION EVERY 12 HOURS SCHEDULED
Status: DISCONTINUED | OUTPATIENT
Start: 2024-03-16 | End: 2024-03-17 | Stop reason: HOSPADM

## 2024-03-16 RX ORDER — FAMOTIDINE 20 MG/1
40 TABLET, FILM COATED ORAL DAILY
Status: DISCONTINUED | OUTPATIENT
Start: 2024-03-16 | End: 2024-03-17 | Stop reason: HOSPADM

## 2024-03-16 RX ORDER — LABETALOL 200 MG/1
400 TABLET, FILM COATED ORAL 2 TIMES DAILY
Status: DISCONTINUED | OUTPATIENT
Start: 2024-03-16 | End: 2024-03-16

## 2024-03-16 RX ORDER — BUSPIRONE HYDROCHLORIDE 15 MG/1
15 TABLET ORAL 2 TIMES DAILY
Status: DISCONTINUED | OUTPATIENT
Start: 2024-03-16 | End: 2024-03-17 | Stop reason: HOSPADM

## 2024-03-16 RX ORDER — ENOXAPARIN SODIUM 100 MG/ML
40 INJECTION SUBCUTANEOUS DAILY
Status: DISCONTINUED | OUTPATIENT
Start: 2024-03-17 | End: 2024-03-16

## 2024-03-16 RX ORDER — SODIUM CHLORIDE 9 MG/ML
INJECTION, SOLUTION INTRAVENOUS PRN
Status: DISCONTINUED | OUTPATIENT
Start: 2024-03-16 | End: 2024-03-17 | Stop reason: HOSPADM

## 2024-03-16 RX ORDER — PROCHLORPERAZINE EDISYLATE 5 MG/ML
10 INJECTION INTRAMUSCULAR; INTRAVENOUS EVERY 6 HOURS PRN
Status: DISCONTINUED | OUTPATIENT
Start: 2024-03-16 | End: 2024-03-17 | Stop reason: HOSPADM

## 2024-03-16 RX ORDER — POTASSIUM CHLORIDE 20 MEQ/1
40 TABLET, EXTENDED RELEASE ORAL PRN
Status: DISCONTINUED | OUTPATIENT
Start: 2024-03-16 | End: 2024-03-17 | Stop reason: HOSPADM

## 2024-03-16 RX ORDER — SODIUM CHLORIDE 0.9 % (FLUSH) 0.9 %
5-40 SYRINGE (ML) INJECTION PRN
Status: DISCONTINUED | OUTPATIENT
Start: 2024-03-16 | End: 2024-03-17 | Stop reason: HOSPADM

## 2024-03-16 RX ORDER — ACETAMINOPHEN 650 MG/1
650 SUPPOSITORY RECTAL EVERY 6 HOURS PRN
Status: DISCONTINUED | OUTPATIENT
Start: 2024-03-16 | End: 2024-03-17 | Stop reason: HOSPADM

## 2024-03-16 RX ADMIN — BUSPIRONE HYDROCHLORIDE 15 MG: 15 TABLET ORAL at 10:03

## 2024-03-16 RX ADMIN — LABETALOL HYDROCHLORIDE 400 MG: 200 TABLET, FILM COATED ORAL at 11:51

## 2024-03-16 RX ADMIN — TIZANIDINE 2 MG: 4 TABLET ORAL at 21:06

## 2024-03-16 RX ADMIN — TRAMADOL HYDROCHLORIDE 50 MG: 50 TABLET, COATED ORAL at 11:50

## 2024-03-16 RX ADMIN — SODIUM CHLORIDE, PRESERVATIVE FREE 10 ML: 5 INJECTION INTRAVENOUS at 10:06

## 2024-03-16 RX ADMIN — NIFEDIPINE 90 MG: 30 TABLET, EXTENDED RELEASE ORAL at 10:02

## 2024-03-16 RX ADMIN — TRAMADOL HYDROCHLORIDE 50 MG: 50 TABLET, COATED ORAL at 06:14

## 2024-03-16 RX ADMIN — HYDRALAZINE HYDROCHLORIDE 50 MG: 50 TABLET, FILM COATED ORAL at 11:07

## 2024-03-16 RX ADMIN — PROCHLORPERAZINE EDISYLATE 10 MG: 5 INJECTION INTRAMUSCULAR; INTRAVENOUS at 13:57

## 2024-03-16 RX ADMIN — BUPROPION HYDROCHLORIDE 300 MG: 150 TABLET, EXTENDED RELEASE ORAL at 10:03

## 2024-03-16 RX ADMIN — FAMOTIDINE 40 MG: 20 TABLET ORAL at 10:03

## 2024-03-16 RX ADMIN — TRAZODONE HYDROCHLORIDE 150 MG: 100 TABLET ORAL at 21:06

## 2024-03-16 RX ADMIN — BUSPIRONE HYDROCHLORIDE 15 MG: 15 TABLET ORAL at 21:06

## 2024-03-16 RX ADMIN — HYDRALAZINE HYDROCHLORIDE 10 MG: 20 INJECTION, SOLUTION INTRAMUSCULAR; INTRAVENOUS at 03:46

## 2024-03-16 RX ADMIN — ONDANSETRON 4 MG: 2 INJECTION INTRAMUSCULAR; INTRAVENOUS at 10:10

## 2024-03-16 RX ADMIN — SODIUM CHLORIDE, PRESERVATIVE FREE 10 ML: 5 INJECTION INTRAVENOUS at 21:10

## 2024-03-16 RX ADMIN — ACETAMINOPHEN 650 MG: 325 TABLET ORAL at 10:02

## 2024-03-16 ASSESSMENT — PAIN DESCRIPTION - LOCATION
LOCATION: HEAD
LOCATION: HEAD

## 2024-03-16 ASSESSMENT — PAIN DESCRIPTION - DESCRIPTORS: DESCRIPTORS: ACHING

## 2024-03-16 ASSESSMENT — PAIN SCALES - GENERAL
PAINLEVEL_OUTOF10: 8
PAINLEVEL_OUTOF10: 8

## 2024-03-16 NOTE — PROGRESS NOTES
Consulted Nephrology. Spoke with Ruth Somers NP and orders received for Urine testing.     Requested report: Renal Artery Duplex Evaluation from [facility] that was done on 3/12/24 received via fax and placed in soft chart for review.

## 2024-03-16 NOTE — H&P
History and Physical    Patient Name:  Ml Abdalla    :  1990    Chief Complaint:   Uncontrolled HTN    History of Present Illness:   Ml Abdalla presents to [facility] with elevated BP, headache, blurry vision for the last couple days. No chest pain, no dyspnea, no palpitations. PMH recently diagnosed renal artery stenosis, CKD 4, resistant hypertension, depression, coronary artery disease, pulmonary stenosis status post open tissue valve replacement. Recently patient was admitted to [facility] with hypertensive urgency.  She was diagnosed with renal artery stenosis and started on hypertension medications including labetalol 200 mg twice daily Procardia 90 mg ER daily, and irbesartan 300 mg nightly. Since the discharge, she was not able to control her BP. Systolic has been in 200's. She was taking on her own hydralazine which seemed to help but she ran out.  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      Past Medical History:   has a past medical history of Anemia, Anxiety, Back pain, CAD (coronary artery disease), CKD (chronic kidney disease) stage 4, GFR 15-29 ml/min ([facility]), Depression, Fibromyalgia, Hypertension, and Pulmonary stenosis.    Surgical History:   has a past surgical history that includes Cardiac surgery (); Hysterectomy ();  section; hernia repair (); Incision and drainage of wound (); Dental surgery (); Cholecystectomy, laparoscopic (); Ear surgery; Abdominoplasty (); and laparotomy.     Social History:   reports that she has never smoked. She has never used smokeless tobacco. She reports that she does not currently use alcohol. She reports that she does not use drugs.     Family History:  family history includes Breast Cancer in her half-sister; No Known Problems in her daughter, daughter,

## 2024-03-16 NOTE — PROGRESS NOTES
Attending Physician notified about current BP; 125/39 and 113/35; Primary RN did not give scheduled Cozaar. Inquired about telemetry placement for patient, physician stated No Tele.

## 2024-03-16 NOTE — ED NOTES
ED TO INPATIENT SBAR HANDOFF    Patient Name: Ml Abdalla   : 1990  34 y.o.   Family/Caregiver Present: No  Code Status Order: Full Code    C-SSRS: Risk of Suicide: No Risk  Sitter No  Restraints:         Situation  Chief Complaint:   Chief Complaint   Patient presents with    Hypertension     Pt states bp is 160s after 150 of hydralazine. Was d/c from [place] Tuesday for HTN     Blurred Vision     With HA     Patient Diagnosis: H/O renal artery stenosis [Z86.79]     Brief Description of Patient's Condition: 34 y.o. female with PMH of recently diagnosed renal artery stenosis, CKD 4, resistant hypertension, depression, coronary artery disease, pulmonary stenosis status post open tissue valve replacement who presents to the emergency department with CC of hypertension.  Patient was admitted to [facility] earlier this week, admitted for couple days and discharged on Tuesday after presenting with hypertensive urgency.  She was diagnosed with renal artery stenosis during that visit based on renal artery duplex ultrasound and was started on hypertension medications including labetalol 200 mg twice daily Procardia 90 mg ER daily, and irbesartan 300 mg nightly.  In the 3 days since discharge, she has had persistently elevated blood pressures and has been needing to take additional blood pressure medication at home to control it.  She reports she was previously on clonidine and hydralazine so she had this at home available.  Each day she has been having blood pressures with systolics in the 200s, so she has been taking approximately 150 mg of hydralazine daily to try to control her blood pressures but systolic is still hovering in the 170s to 180s despite this.  Upon arrival, her systolic is down to the 150s after taking 150 mg of hydralazine at home.  She is having intermittent headaches and blurry vision, but reports due to her genetic JAK2 kinase mutation which contributed to her renal

## 2024-03-16 NOTE — CONSULTS
Renal Consult Note    Thank you to requesting provider: Dr Godoy, for asking us to see Ml Abdalla    Reason for consultation:  CKD stage 4.    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  Patient has some facial morphological features of the syndrome.  She has cardiac and renal involvement but not manage liver disease.  More recently she was having resistant hypertension and she was admitted to [facility].  She was seen by cardiology who suspected renal artery stenosis.  Patient also deals with headaches and was given propranolol by her neurologist in [place].  Her BP meds were changed and she was switched to labetalol and hydralazine.  Patient has CKD stage IV and is followed at the renal clinic.  She currently presents with elevated blood pressure, headache and blurry vision for a couple of days.  Earlier this week she was discharged from the hospital in [place].  Patient was also made aware of pulmonary valve changes is believed to be a part of her syndrome.  Renal service was consulted to manage her CKD.  When I interviewed the patient, she denies NSAIDs abuse.  She has no change to her urine habits.  She stays well-hydrated and follows a low-sodium diet.    Past Medical/Surgical History      Active Ambulatory Problems     Diagnosis Date Noted    Hypertension 08/07/2022    Stage 4 chronic kidney disease (HCC) 08/07/2022    Pulmonary stenosis 08/07/2022    Fibromyalgia 08/07/2022    Back pain 08/07/2022    Anemia 08/07/2022    Depression 08/07/2022    CAD (coronary artery disease) 08/07/2022    Anxiety 08/07/2022    Hypertensive urgency 08/07/2022    Near syncope 08/07/2022    Intractable migraine without aura and with status migrainosus 08/09/2022    Chronic daily headache 08/09/2022    Neck pain 08/09/2022

## 2024-03-16 NOTE — ED PROVIDER NOTES
[facility] EMERGENCY DEPT  eMERGENCY dEPARTMENT eNCOUnter      Pt Name: Ml Abdalla  MRN: [id number]  Birthdate 1990  Date of evaluation: 3/15/2024  Provider: Alicia Limon MD    CHIEF COMPLAINT       Chief Complaint   Patient presents with    Hypertension     Pt states bp is 160s after 150 of hydralazine. Was d/c from [place] Tuesday for HTN     Blurred Vision     With HA         HISTORY OF PRESENT ILLNESS   (Location/Symptom, Timing/Onset,Context/Setting, Quality, Duration, Modifying Factors, Severity)  Note limiting factors.     HPI    lM Abdalla is a 34 y.o. female with PMH of recently diagnosed renal artery stenosis, CKD 4, resistant hypertension, depression, coronary artery disease, pulmonary stenosis status post open tissue valve replacement who presents to the emergency department with CC of hypertension.  Patient was admitted to [facility] earlier this week, admitted for couple days and discharged on Tuesday after presenting with hypertensive urgency.  She was diagnosed with renal artery stenosis during that visit based on renal artery duplex ultrasound and was started on hypertension medications including labetalol 200 mg twice daily Procardia 90 mg ER daily, and irbesartan 300 mg nightly.  In the 3 days since discharge, she has had persistently elevated blood pressures and has been needing to take additional blood pressure medication at home to control it.  She reports she was previously on clonidine and hydralazine so she had this at home available.  Each day she has been having blood pressures with systolics in the 200s, so she has been taking approximately 150 mg of hydralazine daily to try to control her blood pressures but systolic is still hovering in the 170s to 180s despite this.  Upon arrival, her systolic is down to the 150s after taking 150 mg of hydralazine at home.  She is having intermittent headaches and blurry vision, but reports due to her genetic JAK2 kinase

## 2024-03-17 VITALS
WEIGHT: 178 LBS | RESPIRATION RATE: 18 BRPM | OXYGEN SATURATION: 98 % | BODY MASS INDEX: 25.54 KG/M2 | DIASTOLIC BLOOD PRESSURE: 46 MMHG | SYSTOLIC BLOOD PRESSURE: 138 MMHG | TEMPERATURE: 98.4 F | HEART RATE: 84 BPM

## 2024-03-17 LAB
ANION GAP SERPL CALCULATED.3IONS-SCNC: 8 MMOL/L (ref 7–19)
BUN SERPL-MCNC: 44 MG/DL (ref 6–20)
CALCIUM SERPL-MCNC: 8.9 MG/DL (ref 8.6–10)
CHLORIDE SERPL-SCNC: 108 MMOL/L (ref 98–111)
CK SERPL-CCNC: 58 U/L (ref 26–192)
CO2 SERPL-SCNC: 23 MMOL/L (ref 22–29)
CREAT SERPL-MCNC: 2.7 MG/DL (ref 0.5–0.9)
GLUCOSE SERPL-MCNC: 95 MG/DL (ref 74–109)
POTASSIUM SERPL-SCNC: 4.6 MMOL/L (ref 3.5–5)
SODIUM SERPL-SCNC: 139 MMOL/L (ref 136–145)
URATE SERPL-MCNC: 7.5 MG/DL (ref 2.4–5.7)

## 2024-03-17 PROCEDURE — 6370000000 HC RX 637 (ALT 250 FOR IP): Performed by: HOSPITALIST

## 2024-03-17 PROCEDURE — G0378 HOSPITAL OBSERVATION PER HR: HCPCS

## 2024-03-17 PROCEDURE — 6370000000 HC RX 637 (ALT 250 FOR IP): Performed by: INTERNAL MEDICINE

## 2024-03-17 PROCEDURE — 82550 ASSAY OF CK (CPK): CPT

## 2024-03-17 PROCEDURE — 84550 ASSAY OF BLOOD/URIC ACID: CPT

## 2024-03-17 PROCEDURE — 6370000000 HC RX 637 (ALT 250 FOR IP): Performed by: STUDENT IN AN ORGANIZED HEALTH CARE EDUCATION/TRAINING PROGRAM

## 2024-03-17 PROCEDURE — 36415 COLL VENOUS BLD VENIPUNCTURE: CPT

## 2024-03-17 PROCEDURE — 80048 BASIC METABOLIC PNL TOTAL CA: CPT

## 2024-03-17 PROCEDURE — 2580000003 HC RX 258: Performed by: HOSPITALIST

## 2024-03-17 RX ORDER — CLONIDINE HYDROCHLORIDE 0.1 MG/1
0.1 TABLET ORAL EVERY 4 HOURS PRN
Qty: 90 TABLET | Refills: 0 | Status: SHIPPED | OUTPATIENT
Start: 2024-03-17

## 2024-03-17 RX ORDER — HYDRALAZINE HYDROCHLORIDE 50 MG/1
50 TABLET, FILM COATED ORAL 3 TIMES DAILY PRN
Qty: 90 TABLET | Refills: 3 | Status: SHIPPED | OUTPATIENT
Start: 2024-03-17

## 2024-03-17 RX ORDER — ONDANSETRON 4 MG/1
4 TABLET, ORALLY DISINTEGRATING ORAL EVERY 8 HOURS PRN
Qty: 20 TABLET | Refills: 0 | Status: SHIPPED | OUTPATIENT
Start: 2024-03-17

## 2024-03-17 RX ADMIN — NIFEDIPINE 90 MG: 30 TABLET, EXTENDED RELEASE ORAL at 08:46

## 2024-03-17 RX ADMIN — SODIUM CHLORIDE, PRESERVATIVE FREE 10 ML: 5 INJECTION INTRAVENOUS at 08:46

## 2024-03-17 RX ADMIN — ACETAMINOPHEN 650 MG: 325 TABLET ORAL at 08:49

## 2024-03-17 RX ADMIN — LABETALOL HYDROCHLORIDE 400 MG: 200 TABLET, FILM COATED ORAL at 08:46

## 2024-03-17 RX ADMIN — FAMOTIDINE 40 MG: 20 TABLET ORAL at 08:46

## 2024-03-17 RX ADMIN — BUPROPION HYDROCHLORIDE 300 MG: 150 TABLET, EXTENDED RELEASE ORAL at 08:46

## 2024-03-17 RX ADMIN — LOSARTAN POTASSIUM 50 MG: 50 TABLET, FILM COATED ORAL at 08:46

## 2024-03-17 RX ADMIN — BUSPIRONE HYDROCHLORIDE 15 MG: 15 TABLET ORAL at 08:46

## 2024-03-17 ASSESSMENT — PAIN DESCRIPTION - FREQUENCY: FREQUENCY: CONTINUOUS

## 2024-03-17 ASSESSMENT — PAIN SCALES - GENERAL: PAINLEVEL_OUTOF10: 5

## 2024-03-17 ASSESSMENT — PAIN DESCRIPTION - PAIN TYPE: TYPE: ACUTE PAIN

## 2024-03-17 ASSESSMENT — PAIN DESCRIPTION - LOCATION: LOCATION: HEAD

## 2024-03-17 ASSESSMENT — PAIN DESCRIPTION - DESCRIPTORS: DESCRIPTORS: DULL;ACHING

## 2024-03-17 NOTE — PLAN OF CARE
Problem: Pain  Goal: Verbalizes/displays adequate comfort level or baseline comfort level  3/17/2024 1047 by Maricarmen Arce RN  Outcome: HH/HSPC Resolved Met  3/17/2024 0217 by Matias Rodrigez RN  Outcome: Progressing     Problem: Safety - Adult  Goal: Free from fall injury  3/17/2024 1047 by Maricarmen Arce, RN  Outcome: HH/HSPC Resolved Met  3/17/2024 0217 by Matias Rodrigez RN  Outcome: Progressing

## 2024-03-17 NOTE — DISCHARGE SUMMARY
medications      carvedilol 3.125 MG tablet  Commonly known as: COREG     ferrous gluconate 324 (38 Fe) MG tablet  Commonly known as: FERGON     Vitamin D3 125 MCG (5000 UT) Tabs tablet  Generic drug: vitamin D3               Where to Get Your Medications        These medications were sent to [facility] [address] [place] [address] -  [phone] - F [phone] [address] [address]      Phone: [phone]   cloNIDine 0.1 MG tablet  hydrALAZINE 50 MG tablet  ondansetron 4 MG disintegrating tablet         Discharge Instructions:   Follow up with Tami Kim APRN - NP in 2 days.  Take medications as directed.  Resume activity as tolerated.    Diet: ADULT DIET; Regular; Low Fat/Low Chol/High Fiber/2 gm Na     Disposition: Patient is medically stable and will be discharged home.    Time spent on discharge 37 minutes.    Signed:  Dakota Godoy MD  3/17/2024 10:37 AM

## 2024-03-18 LAB
EKG P AXIS: 58 DEGREES
EKG P-R INTERVAL: 140 MS
EKG Q-T INTERVAL: 380 MS
EKG QRS DURATION: 88 MS
EKG QTC CALCULATION (BAZETT): 422 MS
EKG T AXIS: 60 DEGREES

## 2024-03-18 PROCEDURE — 93010 ELECTROCARDIOGRAM REPORT: CPT | Performed by: INTERNAL MEDICINE
